# Patient Record
Sex: MALE | Race: WHITE | NOT HISPANIC OR LATINO | Employment: FULL TIME | URBAN - METROPOLITAN AREA
[De-identification: names, ages, dates, MRNs, and addresses within clinical notes are randomized per-mention and may not be internally consistent; named-entity substitution may affect disease eponyms.]

---

## 2017-12-07 ENCOUNTER — OFFICE VISIT - HEALTHEAST (OUTPATIENT)
Dept: FAMILY MEDICINE | Facility: CLINIC | Age: 42
End: 2017-12-07

## 2017-12-07 ENCOUNTER — COMMUNICATION - HEALTHEAST (OUTPATIENT)
Dept: TELEHEALTH | Facility: CLINIC | Age: 42
End: 2017-12-07

## 2017-12-07 DIAGNOSIS — E78.00 PURE HYPERCHOLESTEROLEMIA: ICD-10-CM

## 2017-12-07 DIAGNOSIS — I10 HIGH BLOOD PRESSURE: ICD-10-CM

## 2017-12-07 ASSESSMENT — MIFFLIN-ST. JEOR: SCORE: 2016.17

## 2017-12-12 ENCOUNTER — RECORDS - HEALTHEAST (OUTPATIENT)
Dept: ADMINISTRATIVE | Facility: OTHER | Age: 42
End: 2017-12-12

## 2017-12-26 ENCOUNTER — OFFICE VISIT - HEALTHEAST (OUTPATIENT)
Dept: FAMILY MEDICINE | Facility: CLINIC | Age: 42
End: 2017-12-26

## 2017-12-26 ENCOUNTER — COMMUNICATION - HEALTHEAST (OUTPATIENT)
Dept: FAMILY MEDICINE | Facility: CLINIC | Age: 42
End: 2017-12-26

## 2017-12-26 DIAGNOSIS — Z00.00 WELLNESS EXAMINATION: ICD-10-CM

## 2017-12-26 DIAGNOSIS — G47.33 OSA (OBSTRUCTIVE SLEEP APNEA): ICD-10-CM

## 2017-12-26 DIAGNOSIS — I10 HYPERTENSION: ICD-10-CM

## 2017-12-26 LAB — PSA SERPL-MCNC: 0.6 NG/ML (ref 0–2.5)

## 2017-12-26 RX ORDER — ASPIRIN 81 MG/1
TABLET, CHEWABLE ORAL
Status: SHIPPED | COMMUNITY
Start: 2017-12-12 | End: 2022-12-06

## 2017-12-26 ASSESSMENT — MIFFLIN-ST. JEOR: SCORE: 2006.53

## 2018-12-12 ENCOUNTER — OFFICE VISIT - HEALTHEAST (OUTPATIENT)
Dept: FAMILY MEDICINE | Facility: CLINIC | Age: 43
End: 2018-12-12

## 2018-12-12 ENCOUNTER — COMMUNICATION - HEALTHEAST (OUTPATIENT)
Dept: TELEHEALTH | Facility: CLINIC | Age: 43
End: 2018-12-12

## 2018-12-12 DIAGNOSIS — R03.0 ELEVATED BLOOD PRESSURE READING WITHOUT DIAGNOSIS OF HYPERTENSION: ICD-10-CM

## 2018-12-12 DIAGNOSIS — Q25.1 COARCTATION OF AORTA (PREDUCTAL) (POSTDUCTAL): ICD-10-CM

## 2018-12-21 ENCOUNTER — RECORDS - HEALTHEAST (OUTPATIENT)
Dept: LAB | Facility: CLINIC | Age: 43
End: 2018-12-21

## 2018-12-24 LAB — BACTERIA SPEC CULT: NORMAL

## 2019-04-16 ENCOUNTER — COMMUNICATION - HEALTHEAST (OUTPATIENT)
Dept: FAMILY MEDICINE | Facility: CLINIC | Age: 44
End: 2019-04-16

## 2019-04-16 ENCOUNTER — OFFICE VISIT - HEALTHEAST (OUTPATIENT)
Dept: FAMILY MEDICINE | Facility: CLINIC | Age: 44
End: 2019-04-16

## 2019-04-16 DIAGNOSIS — Z00.00 ROUTINE ADULT HEALTH MAINTENANCE: ICD-10-CM

## 2019-04-16 DIAGNOSIS — I10 HYPERTENSION, UNSPECIFIED TYPE: ICD-10-CM

## 2019-04-16 LAB
ANION GAP SERPL CALCULATED.3IONS-SCNC: 9 MMOL/L (ref 5–18)
BUN SERPL-MCNC: 20 MG/DL (ref 8–22)
CALCIUM SERPL-MCNC: 9.4 MG/DL (ref 8.5–10.5)
CHLORIDE BLD-SCNC: 107 MMOL/L (ref 98–107)
CHOLEST SERPL-MCNC: 223 MG/DL
CO2 SERPL-SCNC: 27 MMOL/L (ref 22–31)
CREAT SERPL-MCNC: 0.96 MG/DL (ref 0.7–1.3)
ERYTHROCYTE [DISTWIDTH] IN BLOOD BY AUTOMATED COUNT: 12.7 % (ref 11–14.5)
FASTING STATUS PATIENT QL REPORTED: YES
GFR SERPL CREATININE-BSD FRML MDRD: >60 ML/MIN/1.73M2
GLUCOSE BLD-MCNC: 98 MG/DL (ref 70–125)
HBA1C MFR BLD: 5.5 % (ref 3.5–6)
HCT VFR BLD AUTO: 47.2 % (ref 40–54)
HDLC SERPL-MCNC: 40 MG/DL
HGB BLD-MCNC: 15.8 G/DL (ref 14–18)
LDLC SERPL CALC-MCNC: 162 MG/DL
MCH RBC QN AUTO: 30.8 PG (ref 27–34)
MCHC RBC AUTO-ENTMCNC: 33.4 G/DL (ref 32–36)
MCV RBC AUTO: 92 FL (ref 80–100)
PLATELET # BLD AUTO: 173 THOU/UL (ref 140–440)
PMV BLD AUTO: 7.4 FL (ref 7–10)
POTASSIUM BLD-SCNC: 4.2 MMOL/L (ref 3.5–5)
PSA SERPL-MCNC: 0.6 NG/ML (ref 0–2.5)
RBC # BLD AUTO: 5.12 MILL/UL (ref 4.4–6.2)
SODIUM SERPL-SCNC: 143 MMOL/L (ref 136–145)
TRIGL SERPL-MCNC: 103 MG/DL
WBC: 7 THOU/UL (ref 4–11)

## 2019-04-16 ASSESSMENT — MIFFLIN-ST. JEOR: SCORE: 2079.11

## 2019-05-14 ENCOUNTER — OFFICE VISIT - HEALTHEAST (OUTPATIENT)
Dept: FAMILY MEDICINE | Facility: CLINIC | Age: 44
End: 2019-05-14

## 2019-05-14 DIAGNOSIS — I15.9 SECONDARY HYPERTENSION: ICD-10-CM

## 2019-05-14 DIAGNOSIS — E66.3 OVERWEIGHT: ICD-10-CM

## 2019-05-14 RX ORDER — FEXOFENADINE HCL 180 MG/1
180 TABLET ORAL DAILY
Status: SHIPPED | COMMUNITY
Start: 2019-05-14

## 2019-05-14 ASSESSMENT — MIFFLIN-ST. JEOR: SCORE: 2060.97

## 2019-08-08 ENCOUNTER — COMMUNICATION - HEALTHEAST (OUTPATIENT)
Dept: FAMILY MEDICINE | Facility: CLINIC | Age: 44
End: 2019-08-08

## 2019-08-08 DIAGNOSIS — I15.9 SECONDARY HYPERTENSION: ICD-10-CM

## 2019-12-03 ENCOUNTER — RECORDS - HEALTHEAST (OUTPATIENT)
Dept: ADMINISTRATIVE | Facility: OTHER | Age: 44
End: 2019-12-03

## 2020-03-21 ENCOUNTER — COMMUNICATION - HEALTHEAST (OUTPATIENT)
Dept: FAMILY MEDICINE | Facility: CLINIC | Age: 45
End: 2020-03-21

## 2020-03-21 DIAGNOSIS — I15.9 SECONDARY HYPERTENSION: ICD-10-CM

## 2020-06-29 ENCOUNTER — COMMUNICATION - HEALTHEAST (OUTPATIENT)
Dept: FAMILY MEDICINE | Facility: CLINIC | Age: 45
End: 2020-06-29

## 2020-06-29 DIAGNOSIS — I15.9 SECONDARY HYPERTENSION: ICD-10-CM

## 2020-07-08 ENCOUNTER — OFFICE VISIT - HEALTHEAST (OUTPATIENT)
Dept: FAMILY MEDICINE | Facility: CLINIC | Age: 45
End: 2020-07-08

## 2020-07-08 DIAGNOSIS — I15.9 SECONDARY HYPERTENSION: ICD-10-CM

## 2020-07-08 DIAGNOSIS — Z00.00 WELLNESS EXAMINATION: ICD-10-CM

## 2020-07-08 LAB
ANION GAP SERPL CALCULATED.3IONS-SCNC: 9 MMOL/L (ref 5–18)
BUN SERPL-MCNC: 20 MG/DL (ref 8–22)
CALCIUM SERPL-MCNC: 9.4 MG/DL (ref 8.5–10.5)
CHLORIDE BLD-SCNC: 107 MMOL/L (ref 98–107)
CO2 SERPL-SCNC: 27 MMOL/L (ref 22–31)
CREAT SERPL-MCNC: 1.08 MG/DL (ref 0.7–1.3)
ERYTHROCYTE [DISTWIDTH] IN BLOOD BY AUTOMATED COUNT: 12.1 % (ref 11–14.5)
GFR SERPL CREATININE-BSD FRML MDRD: >60 ML/MIN/1.73M2
GLUCOSE BLD-MCNC: 88 MG/DL (ref 70–125)
HCT VFR BLD AUTO: 46 % (ref 40–54)
HGB BLD-MCNC: 16.1 G/DL (ref 14–18)
MCH RBC QN AUTO: 30.7 PG (ref 27–34)
MCHC RBC AUTO-ENTMCNC: 35 G/DL (ref 32–36)
MCV RBC AUTO: 88 FL (ref 80–100)
PLATELET # BLD AUTO: 198 THOU/UL (ref 140–440)
PMV BLD AUTO: 8.2 FL (ref 7–10)
POTASSIUM BLD-SCNC: 3.9 MMOL/L (ref 3.5–5)
PSA SERPL-MCNC: 0.5 NG/ML (ref 0–2.5)
RBC # BLD AUTO: 5.24 MILL/UL (ref 4.4–6.2)
SODIUM SERPL-SCNC: 143 MMOL/L (ref 136–145)
WBC: 8.6 THOU/UL (ref 4–11)

## 2020-07-08 RX ORDER — LISINOPRIL 20 MG/1
20 TABLET ORAL DAILY
Qty: 90 TABLET | Refills: 3 | Status: SHIPPED | OUTPATIENT
Start: 2020-07-08 | End: 2022-07-07

## 2020-07-08 ASSESSMENT — MIFFLIN-ST. JEOR: SCORE: 2034.32

## 2020-07-13 ENCOUNTER — COMMUNICATION - HEALTHEAST (OUTPATIENT)
Dept: FAMILY MEDICINE | Facility: CLINIC | Age: 45
End: 2020-07-13

## 2020-12-16 ENCOUNTER — COMMUNICATION - HEALTHEAST (OUTPATIENT)
Dept: SCHEDULING | Facility: CLINIC | Age: 45
End: 2020-12-16

## 2021-02-12 ENCOUNTER — OFFICE VISIT - HEALTHEAST (OUTPATIENT)
Dept: FAMILY MEDICINE | Facility: CLINIC | Age: 46
End: 2021-02-12

## 2021-02-12 DIAGNOSIS — I10 BENIGN ESSENTIAL HYPERTENSION: ICD-10-CM

## 2021-02-12 DIAGNOSIS — M79.18 MYOFASCIAL MUSCLE PAIN: ICD-10-CM

## 2021-02-15 ENCOUNTER — OFFICE VISIT - HEALTHEAST (OUTPATIENT)
Dept: FAMILY MEDICINE | Facility: CLINIC | Age: 46
End: 2021-02-15

## 2021-02-15 DIAGNOSIS — I10 BENIGN ESSENTIAL HYPERTENSION: ICD-10-CM

## 2021-02-15 DIAGNOSIS — E66.01 MORBID OBESITY (H): ICD-10-CM

## 2021-02-15 DIAGNOSIS — M25.512 PAIN IN SHOULDER REGION, LEFT: ICD-10-CM

## 2021-02-15 ASSESSMENT — MIFFLIN-ST. JEOR: SCORE: 2077.41

## 2021-05-27 NOTE — PROGRESS NOTES
Assessment:      Healthy male exam.      Plan:    1. Routine adult health maintenance  Encourage weight loss, reviewed immunizations and updated, encourage continue baby aspirin.  - Tdap vaccine greater than or equal to 6yo IM  - HM2(CBC w/o Differential)  - Basic Metabolic Panel  - PSA (Prostatic-Specific Antigen), Annual Screen  - Lipid Cascade  - Glycosylated Hemoglobin A1c    2. Hypertension-new and separate problem  The patient continues to have elevated blood pressure and we will need to treat it.  We will start lisinopril and see him back in 4 weeks.  - lisinopril (PRINIVIL,ZESTRIL) 10 MG tablet; Take 1 tablet (10 mg total) by mouth daily.  Dispense: 30 tablet; Refill: 11       All questions answered.     Subjective:      Alfredo Velasquez is a 43 y.o. male who presents for an annual exam.  He notes that his blood pressure continues to be borderline high.  He also has mild seasonal allergies which she is treating over-the-counter.  He is having difficulty doing regular exercise and his weight has not changed.  Healthy Habits:   Regular Exercise: Yes and No  Sunscreen Use: Yes and No  Healthy Diet: No  Dental Visits Regularly: Yes  Seat Belt: Yes  Sexually active: Yes  Monthly Self Testicular Exams:  Yes  Hemoccults: No  Flex Sig: No  Colonoscopy: No  Lipid Profile: Yes  Glucose Screen: Yes  Prevention of Osteoporosis: Yes  Last Dexa: No  Guns at Home:  No      Immunization History   Administered Date(s) Administered     Influenza, inj, historic,unspecified 10/03/2012, 10/19/2018     Td,adult,historic,unspecified 01/10/2008     Tdap 01/10/2008     Immunization status: up to date and documented.    No exam data present     Current Outpatient Medications   Medication Sig Dispense Refill     aspirin 81 mg chewable tablet Chew.       No current facility-administered medications for this visit.      No past medical history on file.  No past surgical history on file.  Patient has no known allergies.  No family  "history on file.  Social History     Socioeconomic History     Marital status:      Spouse name: Not on file     Number of children: Not on file     Years of education: Not on file     Highest education level: Not on file   Occupational History     Not on file   Social Needs     Financial resource strain: Not on file     Food insecurity:     Worry: Not on file     Inability: Not on file     Transportation needs:     Medical: Not on file     Non-medical: Not on file   Tobacco Use     Smoking status: Not on file   Substance and Sexual Activity     Alcohol use: Not on file     Drug use: Not on file     Sexual activity: Not on file   Lifestyle     Physical activity:     Days per week: Not on file     Minutes per session: Not on file     Stress: Not on file   Relationships     Social connections:     Talks on phone: Not on file     Gets together: Not on file     Attends Synagogue service: Not on file     Active member of club or organization: Not on file     Attends meetings of clubs or organizations: Not on file     Relationship status: Not on file     Intimate partner violence:     Fear of current or ex partner: Not on file     Emotionally abused: Not on file     Physically abused: Not on file     Forced sexual activity: Not on file   Other Topics Concern     Not on file   Social History Narrative     Not on file       Review of Systems  Review of Systems          Objective:     Vitals:    04/16/19 0901 04/16/19 0905   BP: (!) 158/100 140/90   Pulse: (!) 56    Resp: 16    SpO2: 98%    Weight: (!) 268 lb (121.6 kg)    Height: 5' 8.25\" (1.734 m)      Body mass index is 40.45 kg/m .    Physical  Physical Exam     Constitutional:    --Vitals as above  --No acute distress  Eyes-  --Sclera noninjected  --Lids and conjunctiva normal  ENT-  --TMs clear  --Sclera noninjected  --Pharynx not erythematous  Neck-  --Neck supple with no cervical lymphadenopathy  Lungs-  --Clear to Auscultation  Heart-  --Regular rate and " rhythm  Abdomen--  --Soft, non-tender, non-distended  Skin-  --Pink and dry  Psych-  --Alert and oriented  --Normal affect

## 2021-05-28 ENCOUNTER — RECORDS - HEALTHEAST (OUTPATIENT)
Dept: ADMINISTRATIVE | Facility: CLINIC | Age: 46
End: 2021-05-28

## 2021-05-28 NOTE — PROGRESS NOTES
"Chief Complaint   Patient presents with     Follow-up     medications        HPI: Patient presents today for multiple issues.  The first is blood pressure.  His blood pressure has been elevated but he is currently taking lisinopril 10 mg daily and today our reading is 132/88 in the clinic.  He does not check his blood pressure out of the clinic.    He also continues to be over nourished.  He states he is lost 3 pounds since her last visit.  He is cutting down portion sizes and increasing exercises.    ROS: No chest pain or shortness of breath or syncope    SH:    reports that he has never smoked. He has never used smokeless tobacco.      FH: The Patient's family history is not on file.     Meds:  Alfredo has a current medication list which includes the following prescription(s): aspirin, fexofenadine, lisinopril, and lisinopril.    O:  /88   Pulse 62   Ht 5' 8.25\" (1.734 m)   Wt (!) 264 lb (119.7 kg)   SpO2 96%   BMI 39.85 kg/m    Alert conversant no acute distress  Ankles show no evidence of edema  Neuro-moves all 4 extremities pupils are equal    A/P:   1. Secondary hypertension  Blood pressure is improving however still not at goal.  Will increase lisinopril to 20 mg daily and recheck in 90 days  - lisinopril (PRINIVIL,ZESTRIL) 20 MG tablet; Take 1 tablet (20 mg total) by mouth daily.  Dispense: 90 tablet; Refill: 0    2. Overweight  Encourage weight loss and exercise.    RTC 3 months                                          "

## 2021-05-29 ENCOUNTER — RECORDS - HEALTHEAST (OUTPATIENT)
Dept: ADMINISTRATIVE | Facility: CLINIC | Age: 46
End: 2021-05-29

## 2021-05-31 VITALS — WEIGHT: 252 LBS | HEIGHT: 68 IN | BODY MASS INDEX: 38.19 KG/M2

## 2021-05-31 VITALS — BODY MASS INDEX: 37.51 KG/M2 | HEIGHT: 69 IN | WEIGHT: 253.25 LBS

## 2021-05-31 NOTE — TELEPHONE ENCOUNTER
Refill Approved    Rx renewed per Medication Renewal Policy. Medication was last renewed on 5/14/19.    Randa Branch, [facility] Triage/Med Refill 8/9/2019     Requested Prescriptions   Pending Prescriptions Disp Refills     lisinopril (PRINIVIL,ZESTRIL) 20 MG tablet [Pharmacy Med Name: LISINOPRIL 20MG TABS] 90 tablet 0     Sig: TAKE ONE TABLET BY MOUTH EVERY DAY       [facility] Refill Protocol Passed - 8/8/2019  4:09 AM        Passed - PCP or prescribing provider visit in past 12 months       Last office visit with prescriber/PCP: 5/14/2019 Gilda Hernandez MD OR same dept: 5/14/2019 Gilda Hernandez MD OR same specialty: 5/14/2019 Gilda Hernandez MD  Last physical: 4/16/2019 Last MTM visit: Visit date not found   Next visit within 3 mo: Visit date not found  Next physical within 3 mo: Visit date not found  Prescriber OR PCP: Gilda Hernandez MD  Last diagnosis associated with med order: 1. Secondary hypertension  - lisinopril (PRINIVIL,ZESTRIL) 20 MG tablet [Pharmacy Med Name: LISINOPRIL 20MG TABS]; TAKE ONE TABLET BY MOUTH EVERY DAY  Dispense: 90 tablet; Refill: 0    If protocol passes may refill for 12 months if within 3 months of last provider visit (or a total of 15 months).             Passed - Serum Potassium in past 12 months     Lab Results   Component Value Date    Potassium 4.2 04/16/2019             Passed - Blood pressure filed in past 12 months     BP Readings from Last 1 Encounters:   05/14/19 132/88             Passed - Serum Creatinine in past 12 months     Creatinine   Date Value Ref Range Status   04/16/2019 0.96 0.70 - 1.30 mg/dL Final

## 2021-06-02 VITALS — WEIGHT: 268 LBS | HEIGHT: 68 IN | BODY MASS INDEX: 40.62 KG/M2

## 2021-06-02 VITALS — WEIGHT: 269.25 LBS | BODY MASS INDEX: 40.64 KG/M2

## 2021-06-03 VITALS — WEIGHT: 264 LBS | BODY MASS INDEX: 40.01 KG/M2 | HEIGHT: 68 IN

## 2021-06-04 VITALS
TEMPERATURE: 98.7 F | SYSTOLIC BLOOD PRESSURE: 138 MMHG | DIASTOLIC BLOOD PRESSURE: 80 MMHG | HEIGHT: 68 IN | BODY MASS INDEX: 39.25 KG/M2 | HEART RATE: 68 BPM | OXYGEN SATURATION: 96 % | WEIGHT: 259 LBS

## 2021-06-05 VITALS
RESPIRATION RATE: 14 BRPM | SYSTOLIC BLOOD PRESSURE: 182 MMHG | WEIGHT: 269 LBS | OXYGEN SATURATION: 98 % | TEMPERATURE: 97.8 F | HEART RATE: 61 BPM | BODY MASS INDEX: 39.72 KG/M2 | DIASTOLIC BLOOD PRESSURE: 94 MMHG

## 2021-06-05 VITALS
BODY MASS INDEX: 39.25 KG/M2 | DIASTOLIC BLOOD PRESSURE: 88 MMHG | OXYGEN SATURATION: 98 % | HEIGHT: 69 IN | SYSTOLIC BLOOD PRESSURE: 128 MMHG | HEART RATE: 67 BPM | WEIGHT: 265 LBS

## 2021-06-07 NOTE — TELEPHONE ENCOUNTER
Refill Request  Did you contact pharmacy: No  Medication name: lisinopril (PRINIVIL,ZESTRIL) 20 MG tablet  Requested Prescriptions      No prescriptions requested or ordered in this encounter     Who prescribed the medication: 05/14/19  Requested Pharmacy: [facility]  Is patient out of medication: No.  4 days left  Patient notified refills processed in 3 business days:  yes  Okay to leave a detailed message: yes

## 2021-06-07 NOTE — TELEPHONE ENCOUNTER
Refill Approved    Rx renewed per Medication Renewal Policy. Medication was last renewed on 8/9/19, last OV 5/14/19.    Kaylyn John, [facility] Triage/Med Refill 3/21/2020     Requested Prescriptions   Pending Prescriptions Disp Refills     lisinopriL (PRINIVIL,ZESTRIL) 20 MG tablet 90 tablet 1     Sig: Take 1 tablet (20 mg total) by mouth daily.       There is no refill protocol information for this order

## 2021-06-09 NOTE — TELEPHONE ENCOUNTER
RN cannot approve Refill Request    RN can NOT refill this medication Protocol failed and NO refill given.      Indy Lloyd, [facility] Triage/Med Refill 6/30/2020    Requested Prescriptions   Pending Prescriptions Disp Refills     lisinopriL (PRINIVIL,ZESTRIL) 20 MG tablet 90 tablet 3     Sig: Take 1 tablet (20 mg total) by mouth daily.       [facility] Refill Protocol Failed - 6/29/2020 12:35 PM        Failed - PCP or prescribing provider visit in past 12 months       Last office visit with prescriber/PCP: 5/14/2019 Gilda Hernandez MD OR same dept: Visit date not found OR same specialty: 5/14/2019 Gilda Hernandez MD  Last physical: 4/16/2019 Last MTM visit: Visit date not found   Next visit within 3 mo: Visit date not found  Next physical within 3 mo: Visit date not found  Prescriber OR PCP: Gilda Hernandez MD  Last diagnosis associated with med order: 1. Secondary hypertension  - lisinopriL (PRINIVIL,ZESTRIL) 20 MG tablet; Take 1 tablet (20 mg total) by mouth daily.  Dispense: 90 tablet; Refill: 0    If protocol passes may refill for 12 months if within 3 months of last provider visit (or a total of 15 months).             Failed - Serum Potassium in past 12 months     No results found for: LN-POTASSIUM          Failed - Blood pressure filed in past 12 months     BP Readings from Last 1 Encounters:   05/14/19 132/88             Failed - Serum Creatinine in past 12 months     Creatinine   Date Value Ref Range Status   04/16/2019 0.96 0.70 - 1.30 mg/dL Final

## 2021-06-09 NOTE — PROGRESS NOTES
Assessment:      Healthy male exam.      Plan:    1. Secondary hypertension  The patient's blood pressure is relatively well controlled although he is under quite a bit of stress right now.  We will continue with the 20 mg of lisinopril and increase if needed.  - lisinopriL (PRINIVIL,ZESTRIL) 20 MG tablet; Take 1 tablet (20 mg total) by mouth daily.  Dispense: 90 tablet; Refill: 3    2. Wellness examination  Encourage exercise, [employer] attendance, weight loss, and healthy diet.  - HM2(CBC w/o Differential)  - Basic Metabolic Panel  - PSA (Prostatic-Specific Antigen), Annual Screen  - Tdap vaccine,  6yo or older,  IM    RTC 1 year     All questions answered.     Subjective:      Alfredo Velasquez is a 44 y.o. male who presents for an annual exam.  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  He is undergoing a great deal of stress due to the coronavirus hysteria and political unrest in the community.  Healthy Habits:   Regular Exercise: N/A  Sunscreen Use: No  Healthy Diet: Yes  Dental Visits Regularly: Yes/no  Seat Belt: Yes  Sexually active: Yes  Monthly Self Testicular Exams:  Yes  Hemoccults: No  Flex Sig: No  Colonoscopy: No  Lipid Profile: Yes  Glucose Screen: Yes  Prevention of Osteoporosis: Yes  Last Dexa: No  Guns at Home:  No      Immunization History   Administered Date(s) Administered     Influenza, inj, historic,unspecified 10/03/2012, 10/19/2018     Influenza,seasonal, Inj IIV3 12/09/2005, 10/03/2012     Td,adult,historic,unspecified 01/10/2008     Tdap 01/10/2008, 04/16/2019     Immunization status: up to date and documented.    No exam data present     Current Outpatient Medications   Medication Sig Dispense Refill     fexofenadine (ALLEGRA) 180 MG tablet Take 180 mg by mouth daily.       lisinopriL (PRINIVIL,ZESTRIL) 20 MG tablet Take 1 tablet (20 mg total) by mouth daily. 15 tablet 0     aspirin 81 mg chewable tablet Chew.       No current facility-administered medications for this visit.       No past medical history on file.  No past surgical history on file.  Patient has no known allergies.  No family history on file.  Social History     Socioeconomic History     Marital status:      Spouse name: Not on file     Number of children: Not on file     Years of education: Not on file     Highest education level: Not on file   Occupational History     Not on file   Social Needs     Financial resource strain: Not on file     Food insecurity     Worry: Not on file     Inability: Not on file     Transportation needs     Medical: Not on file     Non-medical: Not on file   Tobacco Use     Smoking status: Never Smoker     Smokeless tobacco: Never Used   Substance and Sexual Activity     Alcohol use: Not on file     Drug use: Not on file     Sexual activity: Not on file   Lifestyle     Physical activity     Days per week: Not on file     Minutes per session: Not on file     Stress: Not on file   Relationships     Social connections     Talks on phone: Not on file     Gets together: Not on file     Attends Ho[employer] service: Not on file     Active member of club or organization: Not on file     Attends meetings of clubs or organizations: Not on file     Relationship status: Not on file     Intimate partner violence     Fear of current or ex partner: Not on file     Emotionally abused: Not on file     Physically abused: Not on file     Forced sexual activity: Not on file   Other Topics Concern     Not on file   Social History Narrative     Not on file       Review of Systems  Review of Systems          Objective:     There were no vitals filed for this visit.  There is no height or weight on file to calculate BMI.    Physical  Physical Exam     Constitutional:    --Vitals as above  --No acute distress  Eyes-  --Sclera noninjected  --Lids and conjunctiva normal  ENT-  --TMs clear  --Sclera noninjected  --Pharynx not erythematous  Neck-  --Neck supple with no cervical lymphadenopathy  Lungs-  --Clear to  Auscultation  Heart-  --Regular rate and rhythm  Abdomen--  --Soft, non-tender, non-distended  Skin-  --Pink and dry  Psych-  --Alert and oriented  --Normal affect

## 2021-06-09 NOTE — TELEPHONE ENCOUNTER
Patient Returning Call  Reason for call:  Return call  Information relayed to patient:  n/a  Patient has additional questions:  Yes  If YES, what are your questions/concerns:  Patient stated he is scheduled for 7/8 with Gilda Hernandez MD but he does not have enough to get him thru his appointment. Patient is questioning if he can get enough to get him thru his appointment. Patient stated he only has 1 pill for tonight.  Okay to leave a detailed message?: Yes  [phone]

## 2021-06-13 NOTE — TELEPHONE ENCOUNTER
Gas leak at work.  Ha and shaky, thirsty. Nausea.  Went home.    Natural gas leak? Not sure. Dizzy, light headed.  Family member is bringing him to the ED.    Krystal Kevin RN [facility] Triage Nurse Advisor    Reason for Disposition    [1] Breathing difficulty persists > 10 minutes AND [2] after moving to fresh air    Additional Information    Negative: [1] Breathing stopped AND [2] hasn't returned    Negative: Severe difficulty breathing (e.g., struggling for each breath, speaks in single words)    Negative: Severe weakness (i.e., unable to walk or barely able to walk, requires support)    Negative: Difficult to awaken or acting confused (e.g., disoriented, slurred speech)    Negative: Bluish (or gray) lips or face now    Negative: Chest pain, tightness, or heaviness (present now)    Negative: Seizure    Negative: Coughing up dark sputum (e.g., soot)    Negative: Stridor or hoarseness (change in voice)    Negative: Facial burns, mouth burns, or singed nasal hairs    Negative: Patient attempted suicide (e.g., sitting in garage with car running)    Negative: [1] Bioterrorist event, known or suspected AND [2] exposure to biological, chemical or radioactive substance    Negative: Sounds like a life-threatening emergency to the triager    Protocols used: SMOKE AND FUME INHALATION-A-

## 2021-06-14 NOTE — PROGRESS NOTES
"    DATE OF SERVICE: 2017    SUBJECTIVE:  Very pleasant 42-year-old gentleman presents today for recheck of blood  pressure.  He was seen at urgent care for a \\"cold\\" and was noted to have blood  pressure 155 systolic.  Since that time his wife has been taking his blood pressure  and she finds it is being consistently in the 140/85 range or close to that.  The  patient has a past history of hyperlipidemia, but has not been seen in our clinic  for approximately 3-1/2 years.    FAMILY HISTORY:  Notable for mother with hypertension.  Father  of coronary  artery disease.      Socially, he does not smoke.  He has 4 children.  He works in the [employer] industry.      REVIEW OF SYSTEMS:  No fever or chills.  Mild sore throat noted.  No chest pain or  shortness of breath.  There is 1+ ankle edema bilaterally which is constant for him.    OBJECTIVE:  VITAL SIGNS:  Shows a blood pressure 140/88, pulse 74, respirations 16.  He is  alert, conversant, in no acute distress.  HEENT:  TMs clear, sclerae not injected pharynx is slightly erythematous.    NECK:  Supple, with mild cervical lymphadenopathy, no thyromegaly noted.  LUNGS:  Clear to auscultation.  HEART:  Regular rhythm, normal S1, normal, S2.  1+ ankle edema noted.  ABDOMEN:  Obese but nontender.  SKIN:  Pink and dry.  PSYCH:  Alert and oriented x3, makes good eye contact.  He has good memory, insight  and judgment.    ASSESSMENT:  1.  Elevated blood pressure.  2.  Obesity.  3.  Hyperlipidemia by history.    PLAN:  1.  Discussed blood pressure in detail and control with diet and exercise.  We  discussed JNC 8 parameters.  The patient and his wife have a blood pressure  measuring device and his wife will check it 3 times a week over the next couple  weeks and he will follow up for a full physical exam.  Would consider medication but  would like to avoid that if possible.   2.  Full physical exam within the next couple weeks.   3.  Discussed his weight in detail.  " He set a weight goal of 200.  We discussed ways  of getting weight down including increased exercise, decrease calories.  4.  Aspirin 81 mg daily prescribed.  5.  Patient will follow up for full physical exam or sooner if needed.      HANY MÉNDEZ MD  pa  D 12/07/2017 14:04:18  T 12/07/2017 14:49:04  R 12/07/2017 14:49:04  [id number]        cc:HANY MÉNDEZ MD

## 2021-06-15 NOTE — PROGRESS NOTES
Assessment:     1. Myofascial muscle pain  cyclobenzaprine (FLEXERIL) 5 MG tablet   2. Benign essential hypertension            Plan:     Patient with myofascial muscle pain, possibly due to to his recent heavy physical activity at work.  Prescription given for cyclobenzaprine.  May also take ibuprofen 400 mg every 6 hours as needed with food.  May use heat to the area as well as massage.  Follow-up with his primary care provider if his symptoms are getting worse or not improving.    Blood pressure noted to be quite elevated today and still continues to be elevated upon recheck.  He does have a wrist blood pressure monitor at home and advised that he continue to take his blood pressure at home and to follow-up with his primary care provider if it still continues to be elevated.  Blood pressure not elevated at his previous visit 6 months ago.   :149    Patient Instructions   Recommend heat, massage, and ibuprofen 400 mg every 6 hours as needed.  Take muscle relaxant up to three times a day as needed (do not operate a care or heavy machinery while taking).        Subjective:       45 y.o. male presents for evaluation of approximately 1 day history of pain and tenderness just medial to his left shoulder blade.  The pain is worse when he takes a deep breath in and when he moves his left arm.  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  He denies any fevers, chills, shortness of breath, dyspnea on exertion, orthopnea, PND.  He has not noticed any skin rashes.  He has some slight radiation of discomfort down his left arm when he moves his arm but otherwise he is not having any pain or tingling.  He denies any neck pain.  He denies any chest pain or radiation of pain to his jaw.  He is otherwise been feeling well.  He has not taken anything for his symptoms.    Patient Active Problem List   Diagnosis     Essential Hypercholesterolemia     Foot Pain (Soft Tissue)     Pes Planus     Acute  Sore Throat     High blood pressure       No past medical history on file.    No past surgical history on file.    Current Outpatient Medications on File Prior to Visit   Medication Sig Dispense Refill     aspirin 81 mg chewable tablet Chew.       fexofenadine (ALLEGRA) 180 MG tablet Take 180 mg by mouth daily.       lisinopriL (PRINIVIL,ZESTRIL) 20 MG tablet Take 1 tablet (20 mg total) by mouth daily. 90 tablet 3     No current facility-administered medications on file prior to visit.        No Known Allergies      Review of Systems  A 12 point comprehensive review of systems was negative except as noted.      Objective:   BP (!) 182/94 (Patient Site: Right Arm, Patient Position: Sitting, Cuff Size: Adult Large)   Pulse 61   Temp 97.8  F (36.6  C) (Oral)   Resp 14   Wt (!) 269 lb (122 kg)   SpO2 98%   BMI 39.72 kg/m         General appearance: alert, appears stated age and cooperative  Back: Patient noted to have point tenderness of the musculature just medial to the scapula on the left.  Pain is reproduced in this area when he takes a deep breath in or when he moves his left arm.  There is no overlying skin changes.  No vertebral tenderness.  Lungs: clear to auscultation bilaterally  Chest wall: no tenderness  Heart: regular rate and rhythm, S1, S2 normal, no murmur, click, rub or gallop       This note has been dictated using voice recognition software. Any grammatical or context distortions are unintentional and inherent to the software

## 2021-06-15 NOTE — PATIENT INSTRUCTIONS - HE
Recommend heat, massage, and ibuprofen 400 mg every 6 hours as needed.  Take muscle relaxant up to three times a day as needed (do not operate a care or heavy machinery while taking).      
25-Jul-2019 01:47

## 2021-06-15 NOTE — PROGRESS NOTES
DATE OF SERVICE: 12/26/2017    SUBJECTIVE:  This is a very pleasant 42-year-old male who presents today for  physical examination.  He has a history of sleep apnea for which he uses CPAP,  hypertension and he is checking his blood pressure, he has some mild hyperlipidemia,  and he is overweight.  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      SOCIAL HISTORY:  He does not smoke.    REVIEW OF SYSTEMS:  No fever, chills, chest pain or shortness of breath.  The bowel  and bladder are working well with no difficulty with intercourse.    MEDICATIONS:  81 mg aspirin daily.    OBJECTIVE:  VITAL SIGNS:  138/82, pulse 50, respirations 16.  HEENT:  TMs clear, sclerae not injected, pharynx nonerythematous.    NECK:  Supple, with no lymphadenopathy.  LUNGS:  Clear to auscultation.  HEART:  Regular rate and rhythm, normal S1, normal S2.  ABDOMEN:  Soft, nontender, no masses.  No hepatosplenomegaly.  PSYCHIATRIC:  Alert and oriented x3.    ASSESSMENT:  1.  Over nourishment.  2.  Obstructive sleep apnea.  3.  Hypertension.  4.  Hyperlipidemia.    PLAN:  1.  We will check labs today.  2.  Anticipatory guidance given.  3.  Health maintenance items are reviewed and immunizations reviewed.  4.  Encourage weight loss.  5.  Encourage continued CPAP.  6.  We discussed blood pressure in detail and his blood pressures are running in the  140 range systolically.  We discussed medication but patient declined.  He agreed to  follow up in 3 months, continue to lose weight and exercise, and then consider  medication at that point if not improved.      HANY MÉNDEZ MD  ca  D 12/26/2017 08:40:32  T 12/26/2017 09:24:44  R 12/26/2017 09:24:44  [id number]        cc:HANY MÉNDEZ MD

## 2021-06-15 NOTE — PROGRESS NOTES
ASSESSMENT/PLAN:       1. Pain in shoulder region, left  This is likely overuse type syndrome improving and he can continue using acetaminophen for pain and try to keep doing range of motion exercises.  Excuse from work February 14-17 and then is not scheduled to go back to work until February 20 which we will have him do without any restrictions at that time.  If he does not feel that he is going to be able to go back to work on the 20th he will need to be seen on the 19th.  Should try to avoid lifting with his arms above shoulder level.      2. Morbid obesity (H)  Weight loss is very important for him in regard to his overall health with cardiovascular system.  Note that his BMI is 39.1    3. Benign essential hypertension  His blood pressure seems to be okay today and will continue with lisinopril 20 mg daily  Continue to monitor blood pressure outside of the office with goal of systolic blood pressure in the 130s or less and diastolic in the 80s or less    Office visit E/M total time 20 minutes  Follow-up 4 days if not well enough to go back to work on 2/20/2021    Enrique Mcdaniels MD      PROGRESS NOTE   2/16/2021    SUBJECTIVE:  Alfredo Velasquez is a 45 y.o. male  who presents for   Chief Complaint   Patient presents with     Follow-up     left shoulder , numb tight, went to urgent care 2/12 [facility]      Blood Pressure Check     bp has been high,     Paperwork     note for work      The patient is seen today because of pain in his left shoulder blade and shoulder area.  Started on February 11, 2021 and patient was seen at urgent care the following day.  Was given a muscle relaxant which he does not feel really helped.  He has not been taking very much for pain.  The rest has been helpful.  His pain now is more in the anterior left shoulder.  He has tightness in the area.  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  A lot of repetitive work with lifting and throwing hands at night weight  "10 to 15 pounds and pushing and pulling carts that are 300 to 400 pounds.  He did not recall an injury per se at work but he has been doing this kind of repetitive work for a number of years.    The patient's blood pressures also been elevated and he is taking lisinopril 20 mg daily.  Had a basic metabolic panel with a random blood sugar of 117 in December 2020.  At that time his electrolytes and creatinine look satisfactory.    Patient Active Problem List   Diagnosis     Essential Hypercholesterolemia     Foot Pain (Soft Tissue)     Pes Planus     Acute Sore Throat     High blood pressure     Obesity (BMI 35.0-39.9) with comorbidity (H)       Current Outpatient Medications   Medication Sig Dispense Refill     aspirin 81 mg chewable tablet Chew.       fexofenadine (ALLEGRA) 180 MG tablet Take 180 mg by mouth daily.       lisinopriL (PRINIVIL,ZESTRIL) 20 MG tablet Take 1 tablet (20 mg total) by mouth daily. 90 tablet 3     cyclobenzaprine (FLEXERIL) 5 MG tablet Take 1 tablet (5 mg total) by mouth 3 (three) times a day as needed for muscle spasms. 21 tablet 0     No current facility-administered medications for this visit.        Social History     Tobacco Use   Smoking Status Never Smoker   Smokeless Tobacco Never Used           OBJECTIVE:        No results found for this or any previous visit (from the past 240 hour(s)).    Vitals:    02/15/21 1328   BP: 128/88   Pulse: 67   SpO2: 98%   Weight: (!) 265 lb (120.2 kg)   Height: 5' 9\" (1.753 m)     Weight: (!) 265 lb (120.2 kg)          Physical Exam:  GENERAL APPEARANCE: 45-year-old male very pleasant, NAD, well hydrated, well nourished  SKIN:  Normal skin turgor, no lesions/rashes   HEENT: moist mucous membranes, no rhinorrhea  NECK: Normal without adenopathy or masses  CV: RRR, no M/G/R   LUNGS: CTAB  The patient really does not have much tenderness in the rhomboid area or scapular area as he did before but now mild tenderness in the anterior shoulder in the region " of the bicep tendon attachment.  He has fairly good range of motion without any limitation and negative impingement sign.  Strength seems to be good on stress testing the rotator cuff.  EXTREMITY: no edema and full ROM of all joints  NEURO: no focal findings

## 2021-06-16 PROBLEM — I10 HIGH BLOOD PRESSURE: Status: ACTIVE | Noted: 2017-12-07

## 2021-06-16 PROBLEM — E66.01 MORBID OBESITY (H): Status: ACTIVE | Noted: 2021-02-16

## 2021-06-19 NOTE — LETTER
Letter by Gilda Hernandez MD at      Author: Gilda Hernandez MD Service: -- Author Type: --    Filed:  Encounter Date: 4/16/2019 Status: (Other)         Alfredo Velasquez  [address] [place] [address]            April 16, 2019        Dear Mr. Velasquez,        Below are the results from your recent visit:    Resulted Orders   HM2(CBC w/o Differential)   Result Value Ref Range    WBC 7.0 4.0 - 11.0 thou/uL    RBC 5.12 4.40 - 6.20 mill/uL    Hemoglobin 15.8 14.0 - 18.0 g/dL    Hematocrit 47.2 40.0 - 54.0 %    MCV 92 80 - 100 fL    MCH 30.8 27.0 - 34.0 pg    MCHC 33.4 32.0 - 36.0 g/dL    RDW 12.7 11.0 - 14.5 %    Platelets 173 140 - 440 thou/uL    MPV 7.4 7.0 - 10.0 fL   Basic Metabolic Panel   Result Value Ref Range    Sodium 143 136 - 145 mmol/L    Potassium 4.2 3.5 - 5.0 mmol/L    Chloride 107 98 - 107 mmol/L    CO2 27 22 - 31 mmol/L    Anion Gap, Calculation 9 5 - 18 mmol/L    Glucose 98 70 - 125 mg/dL    Calcium 9.4 8.5 - 10.5 mg/dL    BUN 20 8 - 22 mg/dL    Creatinine 0.96 0.70 - 1.30 mg/dL    GFR MDRD Af Amer >60 >60 mL/min/1.73m2    GFR MDRD Non Af Amer >60 >60 mL/min/1.73m2    Narrative    Fasting Glucose reference range is 70-99 mg/dL per  American Diabetes Association (ADA) guidelines.   PSA (Prostatic-Specific Antigen), Annual Screen   Result Value Ref Range    PSA 0.6 0.0 - 2.5 ng/mL    Narrative    Method is Abbott Prostate-Specific Antigen (PSA)  Standard-WHO 1st International (90:10)   Lipid Cascade   Result Value Ref Range    Cholesterol 223 (H) <=199 mg/dL    Triglycerides 103 <=149 mg/dL    HDL Cholesterol 40 >=40 mg/dL    LDL Calculated 162 (H) <=129 mg/dL    Patient Fasting > 8hrs? Yes    Glycosylated Hemoglobin A1c   Result Value Ref Range    Hemoglobin A1c 5.5 3.5 - 6.0 %         Alfredo, your laboratory results are normal.  That is good news.  Thank you for coming in to visit.  We should visit again in 4 weeks to make sure the blood pressure medication is  working.      Sincerely,        BETH Hernandez MD, MINNIE  [facility]  [phone]

## 2021-06-20 NOTE — LETTER
Letter by Gilda Hernandez MD at      Author: Gilda Hernandez MD Service: -- Author Type: --    Filed:  Encounter Date: 7/13/2020 Status: (Other)         Alfredo CASSIE Velasquez  [address] [place] [address]            July 13, 2020        Dear Aristeo Eva,        Below are the results from your recent visit:    Resulted Orders   HM2(CBC w/o Differential)   Result Value Ref Range    WBC 8.6 4.0 - 11.0 thou/uL    RBC 5.24 4.40 - 6.20 mill/uL    Hemoglobin 16.1 14.0 - 18.0 g/dL    Hematocrit 46.0 40.0 - 54.0 %    MCV 88 80 - 100 fL    MCH 30.7 27.0 - 34.0 pg    MCHC 35.0 32.0 - 36.0 g/dL    RDW 12.1 11.0 - 14.5 %    Platelets 198 140 - 440 thou/uL    MPV 8.2 7.0 - 10.0 fL   Basic Metabolic Panel   Result Value Ref Range    Sodium 143 136 - 145 mmol/L    Potassium 3.9 3.5 - 5.0 mmol/L    Chloride 107 98 - 107 mmol/L    CO2 27 22 - 31 mmol/L    Anion Gap, Calculation 9 5 - 18 mmol/L    Glucose 88 70 - 125 mg/dL    Calcium 9.4 8.5 - 10.5 mg/dL    BUN 20 8 - 22 mg/dL    Creatinine 1.08 0.70 - 1.30 mg/dL    GFR MDRD Af Amer >60 >60 mL/min/1.73m2    GFR MDRD Non Af Amer >60 >60 mL/min/1.73m2    Narrative    Fasting Glucose reference range is 70-99 mg/dL per  American Diabetes Association (ADA) guidelines.   PSA (Prostatic-Specific Antigen), Annual Screen   Result Value Ref Range    PSA 0.5 0.0 - 2.5 ng/mL    Narrative    Method is Abbott Prostate-Specific Antigen (PSA)  Standard-WHO 1st International (90:10)         Juan Carlos, your laboratory results are normal.  That is good news.  Thank you for coming in to visit.  We should visit again in 1 year.      Sincerely,        BETH Hernandez MD, MINNIE  [facility]  [phone]

## 2021-06-21 NOTE — LETTER
Letter by Enrique Mcdaniels MD at      Author: Enrique Mcdaniels MD Service: -- Author Type: --    Filed:  Encounter Date: 2/15/2021 Status: (Other)         February 15, 2021     Patient: Alfredo Velasquez   YOB: 1975   Date of Visit: 2/15/2021       To Whom it May Concern:    Alfredo Velasquez was seen in my clinic on 2/15/2021.  Please excuse patient from work 2/14/21 through 2/17/2021 because of an injury to his left shoulder. Rest has been helpful and I anticipate being able to return to work as scheduled on 2/20/2021without restrictions.    If you have any questions or concerns, please don't hesitate to call.    Sincerely,         Electronically signed by Enrique Mcdaniels MD

## 2021-06-22 NOTE — PROGRESS NOTES
DATE OF SERVICE: 12/12/2018    The patient comes in today for multiple issues.  The first need for antibiotics.  He  is going to have dental cleaning done.  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  It was done at the [facility], old records are not available.       REVIEW OF SYSTEMS:  Negative for chest pain or shortness of breath.  He has never  had infection of the graft.    He also has elevated blood pressure.  Old blood pressures from the last visits were  reviewed showing they are borderline today; it is at 148/88.  He feels somewhat  nervous being in the exam room.  He also notes his weight is up to 269.    Socially, he does not smoke.    He has KRAIG and that has been treated with CPAP machine and going smoothly.      OBJECTIVE:  VITAL SIGNS:  148/88, pulse is a 72, respirations 16.    GENERAL:  He alert, conversant, in no acute distress.  SKIN:  Pink and dry.    ASSESSMENT:  1.  Elevated blood pressure.  2.  Obstructive sleep apnea.  3.  Coarctation of the aorta repair.    PLAN:  1.  Started amoxicillin 2 grams 1 hour before dental procedure.    2.  Discussed blood pressure in detail and he will take serial blood pressures and  return if trending above 140/90.  3.  Encouraged weight loss.  We set a goal to 40.  4.  Encourage exercise.  5.  Continue CPAP.  6.  Will see back for regular visits.      MD ganga GARCIA 12/12/2018 13:35:46  T 12/12/2018 15:32:35  R 12/12/2018 15:32:35  [id number]        cc:HANY MÉNDEZ MD

## 2021-06-27 ENCOUNTER — HEALTH MAINTENANCE LETTER (OUTPATIENT)
Age: 46
End: 2021-06-27

## 2021-07-03 NOTE — ADDENDUM NOTE
Addendum Note by Gilda Hernandez MD at 7/2/2020  9:18 AM     Author: Gilda Hernandez MD Service: -- Author Type: Physician    Filed: 7/2/2020  9:18 AM Encounter Date: 6/29/2020 Status: Signed    : Gilda Hernandez MD (Physician)    Addended by: GILDA HERNANDEZ on: 7/2/2020 09:18 AM        Modules accepted: Orders

## 2021-07-05 ENCOUNTER — COMMUNICATION - HEALTHEAST (OUTPATIENT)
Dept: FAMILY MEDICINE | Facility: CLINIC | Age: 46
End: 2021-07-05

## 2021-07-05 DIAGNOSIS — I15.9 SECONDARY HYPERTENSION: ICD-10-CM

## 2021-07-05 RX ORDER — LISINOPRIL 20 MG/1
TABLET ORAL
Qty: 90 TABLET | Refills: 1 | Status: SHIPPED | OUTPATIENT
Start: 2021-07-05 | End: 2022-01-03

## 2021-07-05 NOTE — TELEPHONE ENCOUNTER
Telephone Encounter by Lore Mckay RN at 7/5/2021 10:56 AM     Author: Lore Mckay RN Service: -- Author Type: Registered Nurse    Filed: 7/5/2021 10:57 AM Encounter Date: 7/5/2021 Status: Signed    : Lore Mckay RN (Registered Nurse)       Refill Approved    Rx renewed per Medication Renewal Policy.   Robles Rodriguez Connection Triage/Med Refill 7/5/2021     Requested Prescriptions   Pending Prescriptions Disp Refills   ? lisinopriL (PRINIVIL,ZESTRIL) 20 MG tablet [Pharmacy Med Name: LISINOPRIL 20MG TABS] 90 tablet 3     Sig: TAKE ONE TABLET BY MOUTH EVERY DAY       Ace Inhibitors Refill Protocol Passed - 7/5/2021  4:19 AM        Passed - PCP or prescribing provider visit in past 12 months       Last office visit with prescriber/PCP: 5/14/2019 Gilda Hernandez MD OR same dept: 2/15/2021 Enrique Mcdaniels MD OR same specialty: 2/15/2021 Enrique Mcdaniels MD  Last physical: 7/8/2020 Last MTM visit: Visit date not found   Next visit within 3 mo: Visit date not found  Next physical within 3 mo: Visit date not found  Prescriber OR PCP: Gilda Hernandez MD  Last diagnosis associated with med order: 1. Secondary hypertension  - lisinopriL (PRINIVIL,ZESTRIL) 20 MG tablet [Pharmacy Med Name: LISINOPRIL 20MG TABS]; TAKE ONE TABLET BY MOUTH EVERY DAY  Dispense: 90 tablet; Refill: 3    If protocol passes may refill for 12 months if within 3 months of last provider visit (or a total of 15 months).             Passed - Serum Potassium in past 12 months     Lab Results   Component Value Date    Potassium 4.1 12/16/2020             Passed - Blood pressure filed in past 12 months     BP Readings from Last 1 Encounters:   02/15/21 128/88             Passed - Serum Creatinine in past 12 months     Creatinine   Date Value Ref Range Status   12/16/2020 1.02 0.70 - 1.30 mg/dL Final

## 2021-08-04 ENCOUNTER — TELEPHONE (OUTPATIENT)
Dept: FAMILY MEDICINE | Facility: CLINIC | Age: 46
End: 2021-08-04

## 2021-08-04 DIAGNOSIS — Z79.2 PROPHYLACTIC ANTIBIOTIC: Primary | ICD-10-CM

## 2021-08-04 NOTE — TELEPHONE ENCOUNTER
Reason for Call:  Medication or medication refill:    Do you use a [facility]?  Name of the pharmacy and phone number for the current request:  [facility]    Name of the medication requested: penicillin for a dental appt due to having core tasion of the aortic valve when he was 5  His appt is set for the 23 of aug/ usually takes 2-3 before he goes    Other request:     Can we leave a detailed message on this number? YES    Phone number patient can be reached at: Cell number on file:    Telephone Information:   Mobile [phone]       Best Time:     Call taken on 8/4/2021 at 1:20 PM by Latricia Greer

## 2021-08-05 RX ORDER — AMOXICILLIN 500 MG/1
2000 CAPSULE ORAL ONCE
Qty: 4 CAPSULE | Refills: 0 | Status: SHIPPED | OUTPATIENT
Start: 2021-08-05 | End: 2021-08-05

## 2021-08-05 NOTE — TELEPHONE ENCOUNTER
Prescription for amoxicillin sent to the pharmacy.  I typically send in a prescription for 2 g(4 tabs) 1 hour prior to surgery.  Looks like this is what Dr. Hernandez did in the past as well.    Nilson Gooden, CNP

## 2021-08-22 ENCOUNTER — HEALTH MAINTENANCE LETTER (OUTPATIENT)
Age: 46
End: 2021-08-22

## 2021-10-17 ENCOUNTER — HEALTH MAINTENANCE LETTER (OUTPATIENT)
Age: 46
End: 2021-10-17

## 2022-01-03 DIAGNOSIS — I15.9 SECONDARY HYPERTENSION: ICD-10-CM

## 2022-01-03 RX ORDER — LISINOPRIL 20 MG/1
20 TABLET ORAL DAILY
Qty: 90 TABLET | Refills: 0 | Status: SHIPPED | OUTPATIENT
Start: 2022-01-03 | End: 2022-03-17

## 2022-01-03 NOTE — TELEPHONE ENCOUNTER
Medication: lisinopril 20 MG tablet   Last Date Filled: 7/5/21  Last appointment addressing medication: 2/15/21  Last B/P:  BP Readings from Last 3 Encounters:   02/15/21 128/88   02/12/21 (!) 182/94   07/08/20 138/80     Last labs pertaining to refill: N/A      Pend medication and associate diagnosis before routing to Provider for review.       If patient has not been seen in over 1 year, pend 30 day supply and notify patient they are due for an appointment before any further refills.

## 2022-05-18 ENCOUNTER — OFFICE VISIT (OUTPATIENT)
Dept: FAMILY MEDICINE | Facility: CLINIC | Age: 47
End: 2022-05-18
Payer: COMMERCIAL

## 2022-05-18 VITALS
WEIGHT: 269 LBS | HEIGHT: 69 IN | TEMPERATURE: 98.2 F | BODY MASS INDEX: 39.84 KG/M2 | SYSTOLIC BLOOD PRESSURE: 128 MMHG | DIASTOLIC BLOOD PRESSURE: 80 MMHG | OXYGEN SATURATION: 98 % | HEART RATE: 74 BPM | RESPIRATION RATE: 14 BRPM

## 2022-05-18 DIAGNOSIS — Z11.4 SCREENING FOR HIV WITHOUT PRESENCE OF RISK FACTORS: ICD-10-CM

## 2022-05-18 DIAGNOSIS — R73.9 HYPERGLYCEMIA: ICD-10-CM

## 2022-05-18 DIAGNOSIS — E66.01 MORBID OBESITY (H): ICD-10-CM

## 2022-05-18 DIAGNOSIS — E78.00 PURE HYPERCHOLESTEROLEMIA: ICD-10-CM

## 2022-05-18 DIAGNOSIS — Z11.59 ENCOUNTER FOR HEPATITIS C SCREENING TEST FOR LOW RISK PATIENT: ICD-10-CM

## 2022-05-18 DIAGNOSIS — G47.33 OSA (OBSTRUCTIVE SLEEP APNEA): ICD-10-CM

## 2022-05-18 DIAGNOSIS — I10 BENIGN ESSENTIAL HYPERTENSION: Primary | ICD-10-CM

## 2022-05-18 LAB
ALBUMIN SERPL-MCNC: 4.1 G/DL (ref 3.5–5)
ALP SERPL-CCNC: 108 U/L (ref 45–120)
ALT SERPL W P-5'-P-CCNC: 27 U/L (ref 0–45)
ANION GAP SERPL CALCULATED.3IONS-SCNC: 9 MMOL/L (ref 5–18)
AST SERPL W P-5'-P-CCNC: 23 U/L (ref 0–40)
BILIRUB SERPL-MCNC: 0.5 MG/DL (ref 0–1)
BUN SERPL-MCNC: 15 MG/DL (ref 8–22)
CALCIUM SERPL-MCNC: 9 MG/DL (ref 8.5–10.5)
CHLORIDE BLD-SCNC: 105 MMOL/L (ref 98–107)
CHOLEST SERPL-MCNC: 229 MG/DL
CO2 SERPL-SCNC: 28 MMOL/L (ref 22–31)
CREAT SERPL-MCNC: 0.97 MG/DL (ref 0.7–1.3)
FASTING STATUS PATIENT QL REPORTED: ABNORMAL
GFR SERPL CREATININE-BSD FRML MDRD: >90 ML/MIN/1.73M2
GLUCOSE BLD-MCNC: 93 MG/DL (ref 70–125)
HBA1C MFR BLD: 5.8 % (ref 0–5.6)
HDLC SERPL-MCNC: 38 MG/DL
HIV 1+2 AB+HIV1 P24 AG SERPL QL IA: NEGATIVE
LDLC SERPL CALC-MCNC: 136 MG/DL
POTASSIUM BLD-SCNC: 4 MMOL/L (ref 3.5–5)
PROT SERPL-MCNC: 6.9 G/DL (ref 6–8)
SODIUM SERPL-SCNC: 142 MMOL/L (ref 136–145)
TRIGL SERPL-MCNC: 275 MG/DL

## 2022-05-18 PROCEDURE — 99214 OFFICE O/P EST MOD 30 MIN: CPT | Performed by: FAMILY MEDICINE

## 2022-05-18 PROCEDURE — 83036 HEMOGLOBIN GLYCOSYLATED A1C: CPT | Performed by: FAMILY MEDICINE

## 2022-05-18 PROCEDURE — 80061 LIPID PANEL: CPT | Performed by: FAMILY MEDICINE

## 2022-05-18 PROCEDURE — 87389 HIV-1 AG W/HIV-1&-2 AB AG IA: CPT | Performed by: FAMILY MEDICINE

## 2022-05-18 PROCEDURE — 36415 COLL VENOUS BLD VENIPUNCTURE: CPT | Performed by: FAMILY MEDICINE

## 2022-05-18 PROCEDURE — 80053 COMPREHEN METABOLIC PANEL: CPT | Performed by: FAMILY MEDICINE

## 2022-05-18 PROCEDURE — 86803 HEPATITIS C AB TEST: CPT | Performed by: FAMILY MEDICINE

## 2022-05-18 ASSESSMENT — PAIN SCALES - GENERAL: PAINLEVEL: NO PAIN (0)

## 2022-05-19 LAB — HCV AB SERPL QL IA: NONREACTIVE

## 2022-05-19 NOTE — PROGRESS NOTES
ASSESSMENT/PLAN:       1. Benign essential hypertension  Blood pressure is controlled and the patient will continue on lisinopril 20 mg daily.    - Comprehensive metabolic panel, GFR is greater than 90 with normal electrolytes    2. Essential Hypercholesterolemia    - Lipid panel reflex to direct LDL Non-fasting, 229/275/38/136    The 10-year ASCVD risk score (Jayy MASTERSON Jr., et al., 2013) is: 4.6%    Values used to calculate the score:      Age: 46 years      Sex: Male      Is Non- : No      Diabetic: No      Tobacco smoker: No      Systolic Blood Pressure: 128 mmHg      Is BP treated: Yes      HDL Cholesterol: 38 mg/dL      Total Cholesterol: 229 mg/dL   The patient's 10-year risk of having a cardiovascular event is 4.6% which is at a lower level and we will not start a statin at this point.  The patient has mixed hyperlipidemia now with triglycerides elevated and will stressed to him the importance of weight loss and modifying his diet with fewer simple sugars and carbohydrates    3. Morbid obesity (H)    - Comprehensive metabolic panel, blood glucose was 93 with normal liver function tests    - Hemoglobin A1c, 5.8 which is in the prediabetes range up from 5.5  3 years ago    4. Encounter for hepatitis C screening test for low risk patient    - Hepatitis C Screen Reflex to HCV RNA Quant and Genotype, pending    5. Screening for HIV without presence of risk factors    - HIV Antigen Antibody Combo, negative    6. Hyperglycemia    - Hemoglobin A1c, 5.8    7. KRAIG (obstructive sleep apnea)  - Adult Sleep Eval & Management  Referral; Future  Placed a referral for sleep medicine consultation to assist the patient with is concerned about the CPAP pressures not being adequate.  Also needs new supplies.  Currently has the ResMed Ramsey CPAP.    Test results by [facility]  Follow-up 6 months for preventative healthcare visit    No immunizations due    Enrique Mcdaniels MD      PROGRESS NOTE    5/19/2022    SUBJECTIVE:  [id number]  who presents for   Chief Complaint   Patient presents with     Recheck Medication     Med check and refills      The patient is seen today for a medication follow-up visit.  The patient has a history of hypertension and his blood pressure is controlled today.  He is on lisinopril 20 mg daily.  He does not have any side effects from his lisinopril.    Hyperlipidemia with his last LDL of 162.  He has tried to change his diet and has not gone on a statin.  The patient is on aspirin 81 mg daily    Morbid obesity is tied to diagnoses of hyperlipidemia and hypertension.  Also contributes to the patient's obstructive sleep apnea.  BMI is 39.7  Weight is not changed significantly from a year ago.    Screening for hepatitis C and HIV is needed    Would like to do screening for diabetes because of hyperglycemia    The patient has a ResMed Ramsey CPAP machine which apparently has not been recalled but he needs some new equipment and he is concerned with weight gain since this was last been checked that the pressures may need to be adjusted.  He would like a referral to an [facility] sleep medicine specialist.    The patient is due for colon cancer screening with the new guidelines to start at age 45 and we discussed that.  No family history of colon cancer.  He is going to check with his insurance to see if it is covered at his age.  If so he will proceed with a colonoscopy.    Patient Active Problem List   Diagnosis     Essential Hypercholesterolemia     Foot Pain (Soft Tissue)     Pes Planus     Acute Sore Throat     High blood pressure     Obesity (BMI 35.0-39.9) with comorbidity (H)       Current Outpatient Medications   Medication Sig Dispense Refill     aspirin 81 mg chewable tablet [ASPIRIN 81 MG CHEWABLE TABLET] Chew.       fexofenadine (ALLEGRA) 180 MG tablet [FEXOFENADINE (ALLEGRA) 180 MG TABLET] Take 180 mg by mouth daily.       lisinopriL (PRINIVIL,ZESTRIL) 20 MG tablet  "[LISINOPRIL (PRINIVIL,ZESTRIL) 20 MG TABLET] Take 1 tablet (20 mg total) by mouth daily. 90 tablet 3       History   Smoking Status     Never Smoker   Smokeless Tobacco     Never Used       ROS:  Answers for HPI/ROS submitted by the patient on 5/18/2022  Do you check your blood pressure regularly outside of the clinic?: No  Are your blood pressures ever more than 140 on the top number (systolic) OR more than 90 on the bottom number (diastolic)? (For example, greater than 140/90): Yes  Are you following a low salt diet?: Yes        OBJECTIVE:      Recent Results (from the past 48 hour(s))   HIV Antigen Antibody Combo    Collection Time: 05/18/22  3:32 PM   Result Value Ref Range    HIV Antigen Antibody Combo Negative Negative   Comprehensive metabolic panel    Collection Time: 05/18/22  3:32 PM   Result Value Ref Range    Sodium 142 136 - 145 mmol/L    Potassium 4.0 3.5 - 5.0 mmol/L    Chloride 105 98 - 107 mmol/L    Carbon Dioxide (CO2) 28 22 - 31 mmol/L    Anion Gap 9 5 - 18 mmol/L    Urea Nitrogen 15 8 - 22 mg/dL    Creatinine 0.97 0.70 - 1.30 mg/dL    Calcium 9.0 8.5 - 10.5 mg/dL    Glucose 93 70 - 125 mg/dL    Alkaline Phosphatase 108 45 - 120 U/L    AST 23 0 - 40 U/L    ALT 27 0 - 45 U/L    Protein Total 6.9 6.0 - 8.0 g/dL    Albumin 4.1 3.5 - 5.0 g/dL    Bilirubin Total 0.5 0.0 - 1.0 mg/dL    GFR Estimate >90 >60 mL/min/1.73m2   Lipid panel reflex to direct LDL Non-fasting    Collection Time: 05/18/22  3:32 PM   Result Value Ref Range    Cholesterol 229 (H) <=199 mg/dL    Triglycerides 275 (H) <=149 mg/dL    Direct Measure HDL 38 (L) >=40 mg/dL    LDL Cholesterol Calculated 136 (H) <=129 mg/dL    Patient Fasting > 8hrs? Unknown    Hemoglobin A1c    Collection Time: 05/18/22  3:32 PM   Result Value Ref Range    Hemoglobin A1C 5.8 (H) 0.0 - 5.6 %       Vitals:    05/18/22 1458   BP: 128/80   Pulse: 74   Resp: 14   Temp: 98.2  F (36.8  C)   SpO2: 98%   Weight: 122 kg (269 lb)   Height: 1.753 m (5' 9\")     Wt " Readings from Last 3 Encounters:   05/18/22 122 kg (269 lb)   02/15/21 120.2 kg (265 lb)   02/12/21 122 kg (269 lb)           Physical Exam:  GENERAL APPEARANCE: Pleasant 46-year-old male, NAD, well hydrated, well nourished  SKIN:  Normal skin turgor, no lesions/rashes   HEENT: moist mucous membranes, no rhinorrhea  NECK: Normal without adenopathy or masses  CV: RRR, no M/G/R   LUNGS: CTAB  ABDOMEN: S&NT, no masses or enlarged organs   EXTREMITY: no edema and full ROM of all joints  NEURO: no focal findings

## 2022-05-22 NOTE — RESULT ENCOUNTER NOTE
Juan Carlos,  It was nice to see you in the clinic this past week.  Your test results are back and I would like to go over those results for you.    As we discussed I included the routine screening for hepatitis C and HIV and both of those were negative.  No further follow-up needed on those tests.    The metabolic panel showed that your electrolytes were normal including your calcium.  The urea nitrogen, creatinine and GFR look at your kidney function which is normal.  The blood glucose is a screening for diabetes and 93 is a normal value.  The rest of the metabolic panel looks at your liver function all of which is normal.    The lipids particularly the total cholesterol and triglycerides are elevated from last time.  The total cholesterol should be less than 200 and it is 229 and the triglycerides should be less than 150 and the value was 275.  The HDL cholesterol is the good cholesterol and 38 is on the low side.  The LDL is the bad cholesterol and would like to see that less than 130 and closer to 100.    Hemoglobin A1c is another test that looks at diabetes and its in the prediabetes range at 5.8.  Diabetes is when the A1c is 6.5 or higher and a normal A1c is 5.6 or lower.    In summary what is most important to improve your lipids and prevent diabetes is weight loss.  I realize that weight loss is not easy but even a 10-20 pound weight loss can have a significant improvement in your triglycerides and help prevent the progression to diabetes mellitus.  A diet that is more vegetable based and less meat based is best.  Also limiting your simple sugars and carbohydrates is important.  Whole grains are fine and if you like fish salmon it is a healthy fish.    I do not feel that you need to start any medication for your lipids and would like for you to be able to make improvements with lifestyle changes.    If you have questions or if I can help in any other way please let me know.    Best regards,  Dr. Mcdaniels

## 2022-07-07 ENCOUNTER — MYC MEDICAL ADVICE (OUTPATIENT)
Dept: FAMILY MEDICINE | Facility: CLINIC | Age: 47
End: 2022-07-07

## 2022-07-07 ENCOUNTER — MYC REFILL (OUTPATIENT)
Dept: FAMILY MEDICINE | Facility: CLINIC | Age: 47
End: 2022-07-07

## 2022-07-07 DIAGNOSIS — I15.9 SECONDARY HYPERTENSION: ICD-10-CM

## 2022-07-08 RX ORDER — LISINOPRIL 20 MG/1
20 TABLET ORAL DAILY
Qty: 90 TABLET | Refills: 2 | Status: SHIPPED | OUTPATIENT
Start: 2022-07-08 | End: 2023-03-24

## 2022-07-08 NOTE — TELEPHONE ENCOUNTER
Patient out of medication. Routing back to erx pool high priority.  Letitia Loaiza RN on 7/8/2022 at 8:49 AM

## 2022-07-08 NOTE — TELEPHONE ENCOUNTER
"Last Written Prescription Date:  7/8/20  Last Fill Quantity: 90,  # refills: 3   Last office visit provider:  5/18/22     Requested Prescriptions   Pending Prescriptions Disp Refills     lisinopril (ZESTRIL) 20 MG tablet 90 tablet 3     Sig: Take 1 tablet (20 mg) by mouth daily       ACE Inhibitors (Including Combos) Protocol Passed - 7/8/2022  8:49 AM        Passed - Blood pressure under 140/90 in past 12 months     BP Readings from Last 3 Encounters:   05/18/22 128/80   02/15/21 128/88   02/12/21 (!) 182/94                 Passed - Recent (12 mo) or future (30 days) visit within the authorizing provider's specialty     Patient has had an office visit with the authorizing provider or a provider within the authorizing providers department within the previous 12 mos or has a future within next 30 days. See \"Patient Info\" tab in inbasket, or \"Choose Columns\" in Meds & Orders section of the refill encounter.              Passed - Medication is active on med list        Passed - Patient is age 18 or older        Passed - Normal serum creatinine on file in past 12 months     Recent Labs   Lab Test 05/18/22  1532   CR 0.97       Ok to refill medication if creatinine is low          Passed - Normal serum potassium on file in past 12 months     Recent Labs   Lab Test 05/18/22  1532   POTASSIUM 4.0                  Indy Lloyd, RN 07/08/22 10:24 AM  "

## 2022-07-08 NOTE — TELEPHONE ENCOUNTER
Medical message sent to patient resent rx request high priority.  Letitia Loaiza RN on 7/8/2022 at 8:50 AM

## 2022-10-01 ENCOUNTER — HEALTH MAINTENANCE LETTER (OUTPATIENT)
Age: 47
End: 2022-10-01

## 2022-10-24 ENCOUNTER — OFFICE VISIT (OUTPATIENT)
Dept: FAMILY MEDICINE | Facility: CLINIC | Age: 47
End: 2022-10-24
Payer: COMMERCIAL

## 2022-10-24 VITALS
DIASTOLIC BLOOD PRESSURE: 86 MMHG | HEART RATE: 81 BPM | WEIGHT: 259.9 LBS | OXYGEN SATURATION: 96 % | BODY MASS INDEX: 38.38 KG/M2 | RESPIRATION RATE: 14 BRPM | SYSTOLIC BLOOD PRESSURE: 154 MMHG

## 2022-10-24 DIAGNOSIS — N50.812 PAIN IN LEFT TESTICLE: Primary | ICD-10-CM

## 2022-10-24 LAB
ALBUMIN UR-MCNC: NEGATIVE MG/DL
ANION GAP SERPL CALCULATED.3IONS-SCNC: 12 MMOL/L (ref 7–15)
APPEARANCE UR: CLEAR
BACTERIA #/AREA URNS HPF: ABNORMAL /HPF
BILIRUB UR QL STRIP: NEGATIVE
BUN SERPL-MCNC: 21.7 MG/DL (ref 6–20)
CALCIUM SERPL-MCNC: 8.9 MG/DL (ref 8.6–10)
CHLORIDE SERPL-SCNC: 106 MMOL/L (ref 98–107)
COLOR UR AUTO: YELLOW
CREAT SERPL-MCNC: 1.12 MG/DL (ref 0.67–1.17)
DEPRECATED HCO3 PLAS-SCNC: 25 MMOL/L (ref 22–29)
ERYTHROCYTE [DISTWIDTH] IN BLOOD BY AUTOMATED COUNT: 11.8 % (ref 10–15)
GFR SERPL CREATININE-BSD FRML MDRD: 82 ML/MIN/1.73M2
GLUCOSE SERPL-MCNC: 109 MG/DL (ref 70–99)
GLUCOSE UR STRIP-MCNC: NEGATIVE MG/DL
HCT VFR BLD AUTO: 44.8 % (ref 40–53)
HGB BLD-MCNC: 15.4 G/DL (ref 13.3–17.7)
HGB UR QL STRIP: ABNORMAL
HYALINE CASTS #/AREA URNS LPF: ABNORMAL /LPF
KETONES UR STRIP-MCNC: NEGATIVE MG/DL
LEUKOCYTE ESTERASE UR QL STRIP: NEGATIVE
MCH RBC QN AUTO: 29.6 PG (ref 26.5–33)
MCHC RBC AUTO-ENTMCNC: 34.4 G/DL (ref 31.5–36.5)
MCV RBC AUTO: 86 FL (ref 78–100)
MUCOUS THREADS #/AREA URNS LPF: PRESENT /LPF
NITRATE UR QL: NEGATIVE
PH UR STRIP: 5.5 [PH] (ref 5–8)
PLATELET # BLD AUTO: 178 10E3/UL (ref 150–450)
POTASSIUM SERPL-SCNC: 3.6 MMOL/L (ref 3.4–5.3)
PSA SERPL-MCNC: 0.69 NG/ML (ref 0–2.5)
RBC # BLD AUTO: 5.21 10E6/UL (ref 4.4–5.9)
RBC #/AREA URNS AUTO: ABNORMAL /HPF
SODIUM SERPL-SCNC: 143 MMOL/L (ref 136–145)
SP GR UR STRIP: >=1.03 (ref 1–1.03)
SQUAMOUS #/AREA URNS AUTO: ABNORMAL /LPF
UROBILINOGEN UR STRIP-ACNC: 0.2 E.U./DL
WBC # BLD AUTO: 8.5 10E3/UL (ref 4–11)
WBC #/AREA URNS AUTO: ABNORMAL /HPF

## 2022-10-24 PROCEDURE — 36415 COLL VENOUS BLD VENIPUNCTURE: CPT | Performed by: PHYSICIAN ASSISTANT

## 2022-10-24 PROCEDURE — 81001 URINALYSIS AUTO W/SCOPE: CPT | Performed by: PHYSICIAN ASSISTANT

## 2022-10-24 PROCEDURE — G0103 PSA SCREENING: HCPCS | Performed by: PHYSICIAN ASSISTANT

## 2022-10-24 PROCEDURE — 85027 COMPLETE CBC AUTOMATED: CPT | Performed by: PHYSICIAN ASSISTANT

## 2022-10-24 PROCEDURE — 99214 OFFICE O/P EST MOD 30 MIN: CPT | Performed by: PHYSICIAN ASSISTANT

## 2022-10-24 PROCEDURE — 80048 BASIC METABOLIC PNL TOTAL CA: CPT | Performed by: PHYSICIAN ASSISTANT

## 2022-10-24 PROCEDURE — 87086 URINE CULTURE/COLONY COUNT: CPT | Performed by: PHYSICIAN ASSISTANT

## 2022-10-24 ASSESSMENT — ENCOUNTER SYMPTOMS
FATIGUE: 0
NUMBNESS: 1
BACK PAIN: 0
VOMITING: 0
LIGHT-HEADEDNESS: 0
HEADACHES: 0
SHORTNESS OF BREATH: 0
NAUSEA: 0
DYSURIA: 1
FREQUENCY: 1
CONFUSION: 0
FEVER: 0
CHILLS: 0
ABDOMINAL PAIN: 0
WEAKNESS: 0
DIARRHEA: 0
RECTAL PAIN: 0

## 2022-10-24 ASSESSMENT — PAIN SCALES - GENERAL: PAINLEVEL: MILD PAIN (3)

## 2022-10-24 NOTE — PROGRESS NOTES
"Chief Complaint   Patient presents with     Groin Pain     Groin pain and pain when ejaculating. Been off and on for a couple months. Pain has gotten a little better since it started but still painful. Pain started at work one day and pt thought it was a hernia type of thing.        Assessment & Plan       ICD-10-CM    1. Pain in left testicle  N50.812 UA reflex to Microscopic and Culture     CBC with platelets     Basic metabolic panel     PSA, screen     US Testicular & Scrotum w Doppler Ltd     UA reflex to Microscopic and Culture     CBC with platelets     Basic metabolic panel     PSA, screen     Urine Microscopic     Urine Culture Aerobic Bacterial - lab collect          #1 testicle pain  -PSA, CBC, BMP  -Urinalysis  -Testicular ultrasound  He has been having on and off testicle pain for about 1 to 2 months.  Pain is in his left testicle/scrotum and radiates into the left inguinal canal.  less likely suspect torsion at this time.  He does have a history of a left orchiopexy when he was a teenager.  He does have some scar tissue related to the surgery.  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  He also mentioned he does have some numbness along the left side of the scrotum and into the inguinal canal.  He is not having any fevers, chills, rectal pain, or penile discharge.  He is having some burning with urination and increase in frequency.  On physical exam today he is tender on the epididymis of the left testicle.  Epididymis does not feel enlarged.  No other obvious swelling.  There does not appear to be an inguinal hernia.  He does not have any pain with prolonged sitting.  Discussed further treatment plan based on test results.  Patient was agreeable to this plan.      BMI:   Estimated body mass index is 38.38 kg/m  as calculated from the following:    Height as of 5/18/22: 1.753 m (5' 9\").    Weight as of this encounter: 117.9 kg (259 lb 14.4 oz). "           No follow-ups on file.    [facility] is a 47 year old, presenting for the following health issues:  Groin Pain (Groin pain and pain when ejaculating. Been off and on for a couple months. Pain has gotten a little better since it started but still painful. Pain started at work one day and pt thought it was a hernia type of thing. )      Juan Carlos is a pleasant 47-year-old male presenting to the clinic today for evaluation of testicular pain. He states he was at work on 10/22/2022 and felt a popping/ pain sensation in the groain region.  The pain is been on and off for 1 to 2 months.  The pain is in his testicle/left scrotum and radiates into the left inguinal canal.  He does have a history of left orchiopexy when he was a teenager.  He does have some scar tissue related to the surgery.  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  He is not having any fevers or chills.  Did not have any rectal pain or penile discharge.  He is having some burning with urination on and off and having some frequency.  Only sexually active with 1 partner.    History of Present Illness       Reason for visit:  Pain during intercourse    He eats 2-3 servings of fruits and vegetables daily.He consumes 2 sweetened beverage(s) daily.He exercises with enough effort to increase his heart rate 20 to 29 minutes per day.  He exercises with enough effort to increase his heart rate 3 or less days per week. He is missing 1 dose(s) of medications per week.  He is not taking prescribed medications regularly due to remembering to take.             Review of Systems   Constitutional: Negative for chills, fatigue and fever.   Respiratory: Negative for shortness of breath.    Cardiovascular: Negative for chest pain.   Gastrointestinal: Negative for abdominal pain, diarrhea, nausea, rectal pain and vomiting.    Genitourinary: Positive for dysuria, frequency and testicular pain. Negative for penile discharge, penile pain, penile swelling and scrotal swelling.   Musculoskeletal: Negative for back pain.   Skin: Negative for rash.   Neurological: Positive for numbness. Negative for weakness, light-headedness and headaches.   Psychiatric/Behavioral: Negative for confusion.            Objective    BP (!) 154/86 (BP Location: Left arm, Patient Position: Sitting, Cuff Size: Adult Regular)   Pulse 81   Resp 14   Wt 117.9 kg (259 lb 14.4 oz)   SpO2 96%   BMI 38.38 kg/m    Body mass index is 38.38 kg/m .  Physical Exam  Vitals and nursing note reviewed.   Constitutional:       General: He is not in acute distress.     Appearance: Normal appearance. He is not ill-appearing, toxic-appearing or diaphoretic.   HENT:      Head: Normocephalic and atraumatic.   Eyes:      Conjunctiva/sclera: Conjunctivae normal.   Cardiovascular:      Rate and Rhythm: Normal rate and regular rhythm.      Heart sounds: No murmur heard.    No friction rub. No gallop.   Pulmonary:      Effort: Pulmonary effort is normal.      Breath sounds: No wheezing, rhonchi or rales.   Abdominal:      Hernia: There is no hernia in the left inguinal area or right inguinal area.   Genitourinary:     Penis: Normal.       Testes:         Right: Mass, tenderness, swelling, testicular hydrocele or varicocele not present. Right testis is descended. Cremasteric reflex is present.          Left: Tenderness present. Mass, swelling, testicular hydrocele or varicocele not present. Left testis is descended. Cremasteric reflex is present.       Epididymis:      Right: Normal.      Left: Normal.   Musculoskeletal:      Cervical back: Neck supple.   Neurological:      Mental Status: He is alert.          I assessed this patient in the clinic with Pedro BHAT student.  I agree with the above note which summarizes my findings and current recommendations. I have reviewed all  diagnostics noted and performed physical exam. Changes were made in the body of the note to achieve one comprehensive document.

## 2022-10-25 ENCOUNTER — HOSPITAL ENCOUNTER (OUTPATIENT)
Dept: ULTRASOUND IMAGING | Facility: CLINIC | Age: 47
Discharge: HOME | End: 2022-10-25
Attending: PHYSICIAN ASSISTANT | Admitting: PHYSICIAN ASSISTANT
Payer: COMMERCIAL

## 2022-10-25 DIAGNOSIS — N50.812 PAIN IN LEFT TESTICLE: ICD-10-CM

## 2022-10-25 PROCEDURE — 76870 US EXAM SCROTUM: CPT

## 2022-10-26 ENCOUNTER — HOSPITAL ENCOUNTER (OUTPATIENT)
Dept: ULTRASOUND IMAGING | Facility: CLINIC | Age: 47
Discharge: HOME | End: 2022-10-26
Attending: PHYSICIAN ASSISTANT | Admitting: PHYSICIAN ASSISTANT
Payer: COMMERCIAL

## 2022-10-26 DIAGNOSIS — K40.90 NON-RECURRENT UNILATERAL INGUINAL HERNIA WITHOUT OBSTRUCTION OR GANGRENE: Primary | ICD-10-CM

## 2022-10-26 DIAGNOSIS — R31.9 HEMATURIA, UNSPECIFIED TYPE: ICD-10-CM

## 2022-10-26 DIAGNOSIS — N50.812 PAIN IN LEFT TESTICLE: ICD-10-CM

## 2022-10-26 DIAGNOSIS — N50.812 PAIN IN LEFT TESTICLE: Primary | ICD-10-CM

## 2022-10-26 PROCEDURE — 76705 ECHO EXAM OF ABDOMEN: CPT

## 2022-10-27 LAB — BACTERIA UR CULT: NO GROWTH

## 2022-11-01 ENCOUNTER — LAB (OUTPATIENT)
Dept: LAB | Facility: CLINIC | Age: 47
End: 2022-11-01
Payer: COMMERCIAL

## 2022-11-01 DIAGNOSIS — R31.9 HEMATURIA, UNSPECIFIED TYPE: ICD-10-CM

## 2022-11-01 LAB
ALBUMIN UR-MCNC: NEGATIVE MG/DL
APPEARANCE UR: CLEAR
BACTERIA #/AREA URNS HPF: ABNORMAL /HPF
BILIRUB UR QL STRIP: NEGATIVE
COLOR UR AUTO: YELLOW
GLUCOSE UR STRIP-MCNC: NEGATIVE MG/DL
HGB UR QL STRIP: ABNORMAL
KETONES UR STRIP-MCNC: NEGATIVE MG/DL
LEUKOCYTE ESTERASE UR QL STRIP: NEGATIVE
MUCOUS THREADS #/AREA URNS LPF: PRESENT /LPF
NITRATE UR QL: NEGATIVE
PH UR STRIP: 6 [PH] (ref 5–8)
RBC #/AREA URNS AUTO: ABNORMAL /HPF
SP GR UR STRIP: >=1.03 (ref 1–1.03)
SQUAMOUS #/AREA URNS AUTO: ABNORMAL /LPF
UROBILINOGEN UR STRIP-ACNC: 0.2 E.U./DL
WBC #/AREA URNS AUTO: ABNORMAL /HPF

## 2022-11-01 PROCEDURE — 81001 URINALYSIS AUTO W/SCOPE: CPT

## 2022-11-04 NOTE — PATIENT INSTRUCTIONS
Hernia education & surgery packet provided to pt.    Darrick GARZA [facility]  Surgery Clinic [facility] [place]  [facility] Clinic - [address] [address]  Office: [phone]  Fax: [phone]

## 2022-11-09 ENCOUNTER — OFFICE VISIT (OUTPATIENT)
Dept: SURGERY | Facility: CLINIC | Age: 47
End: 2022-11-09
Payer: COMMERCIAL

## 2022-11-09 VITALS
HEIGHT: 69 IN | WEIGHT: 262 LBS | DIASTOLIC BLOOD PRESSURE: 98 MMHG | BODY MASS INDEX: 38.8 KG/M2 | SYSTOLIC BLOOD PRESSURE: 162 MMHG

## 2022-11-09 DIAGNOSIS — K40.90 NON-RECURRENT UNILATERAL INGUINAL HERNIA WITHOUT OBSTRUCTION OR GANGRENE: ICD-10-CM

## 2022-11-09 PROCEDURE — 99204 OFFICE O/P NEW MOD 45 MIN: CPT | Performed by: SURGERY

## 2022-11-09 NOTE — PROGRESS NOTES
"HPI:  Alfredo Velasquez is a 47 year old male who was referred to me by Shen Hung for left inguinal hernia.  Patient has had some left-sided groin discomfort for the last several months.  Over the last 2 weeks this is worsened significantly.  His work-up included an ultrasound which showed a hernia on that side.  He has noticed a bulge there.  Many of his complaints are urologic in nature including burning with urination.  He has a history of an orchiopexy on the left side.  He has had no issues with incarceration or bowel issues.    Allergies:Patient has no known allergies.    Past Medical History:   Diagnosis Date     Acute Sore Throat     Created by Conversion      Essential Hypercholesterolemia     Created by Conversion      Foot Pain (Soft Tissue)     Created by Conversion [facility] Annotation: Jul 18 2013 12:03PM - Chino Paul: for years      High blood pressure 12/7/2017     Obesity (BMI 35.0-39.9) with comorbidity (H) 2/16/2021     Pes Planus     Created by Conversion        Past Surgical History:   Procedure Laterality Date     AORTA SURGERY      Age: 5     TESTICLE SURGERY      Age: 12   Orchiopexy on the left side    Current Outpatient Medications   Medication Sig Dispense Refill     aspirin 81 mg chewable tablet [ASPIRIN 81 MG CHEWABLE TABLET] Chew.       fexofenadine (ALLEGRA) 180 MG tablet [FEXOFENADINE (ALLEGRA) 180 MG TABLET] Take 180 mg by mouth daily.       lisinopril (ZESTRIL) 20 MG tablet Take 1 tablet (20 mg) by mouth daily 90 tablet 2       Family History   Problem Relation Age of Onset     Hypertension Mother      Hypertension Father      Myocardial Infarction Father         reports that he has never smoked. He has never used smokeless tobacco. He reports current alcohol use. He reports that he does not use drugs.   Patient works at an industrial bakery    BP (!) 162/98   Ht 1.753 m (5' 9\")   Wt 118.8 kg (262 lb)   BMI 38.69 kg/m    Body mass index is 38.69 " kg/m .    EXAM:  GENERAL: Well developed male  HEENT: Pupils are round and reactive  NECK:  No obvious masses or deformities  CV: RRR  PULM: Lungs clear to auscultation bilaterally  ABDOMEN: Soft and nondistended.  Obese.  Nontender.  GROIN: He has a reducible left inguinal hernia.  The bulge is subtle in his subcutaneous fat.  I can palpate the defect.  NEURO: No obvious deficits noted.  EXT: No edema, no obvious deformities or any other abnormalities    IMAGES: Ultrasound personally reviewed  FINDINGS: A 1.7 cm diameter hernia defect left internal inguinal ring through which a 5 x 1.5 x 1.5 cm fat-containing hernia sac passes during Valsalva.                                                              Assessment/Plan:    Alfredo Velasquez is a 47 year old male with a left inguinal hernia.  This is causing him discomfort and merits repair.  We discussed various repair options.  I think a robotic approach would be best considering his current pain which puts him at higher risk for chronic pain and his body habitus.      We discussed the details of surgery.  We discussed the risks including bleeding, infection, and injury to surrounding structures.  We also discussed the incidence of recurrence.  We discussed the possibility of chronic pain.  I explained that some of his pain may get better but he may have some chronic pain even after the hernia is repaired.    The patient and his wife's questions were answered and he would like to proceed with surgery.  We will schedule this at his convenience.    Guero Mcgarry MD  General Surgeon  [facility]  Surgery [address]  [place] [address]?  Office: [phone]

## 2022-11-09 NOTE — LETTER
11/9/2022         RE: Alfredo Velasquez  [address] [place] [address]        Dear Colleague,    Thank you for referring your patient, Alfredo Velasquez, to the [facility] SUR[facility]ERY [facility] [place]. Please see a copy of my visit note below.    HPI:  Alfredo Velasquez is a 47 year old male who was referred to me by Shen Hung for left inguinal hernia.  Patient has had some left-sided groin discomfort for the last several months.  Over the last 2 weeks this is worsened significantly.  His work-up included an ultrasound which showed a hernia on that side.  He has noticed a bulge there.  Many of his complaints are urologic in nature including burning with urination.  He has a history of an orchiopexy on the left side.  He has had no issues with incarceration or bowel issues.    Allergies:Patient has no known allergies.    Past Medical History:   Diagnosis Date     Acute Sore Throat     Created by Conversion      Essential Hypercholesterolemia     Created by Conversion      Foot Pain (Soft Tissue)     Created by [facility] Annotation: Jul 18 2013 12:03PM - Chino Paul: for years      High blood pressure 12/7/2017     Obesity (BMI 35.0-39.9) with comorbidity (H) 2/16/2021     Pes Planus     Created by Conversion        Past Surgical History:   Procedure Laterality Date     AORTA SURGERY      Age: 5     TESTICLE SURGERY      Age: 12   Orchiopexy on the left side    Current Outpatient Medications   Medication Sig Dispense Refill     aspirin 81 mg chewable tablet [ASPIRIN 81 MG CHEWABLE TABLET] Chew.       fexofenadine (ALLEGRA) 180 MG tablet [FEXOFENADINE (ALLEGRA) 180 MG TABLET] Take 180 mg by mouth daily.       lisinopril (ZESTRIL) 20 MG tablet Take 1 tablet (20 mg) by mouth daily 90 tablet 2       Family History   Problem Relation Age of Onset     Hypertension Mother      Hypertension Father      Myocardial Infarction Father         reports that he has never  "smoked. He has never used smokeless tobacco. He reports current alcohol use. He reports that he does not use drugs.   Patient works at an industrial bakery    BP (!) 162/98   Ht 1.753 m (5' 9\")   Wt 118.8 kg (262 lb)   BMI 38.69 kg/m    Body mass index is 38.69 kg/m .    EXAM:  GENERAL: Well developed male  HEENT: Pupils are round and reactive  NECK:  No obvious masses or deformities  CV: RRR  PULM: Lungs clear to auscultation bilaterally  ABDOMEN: Soft and nondistended.  Obese.  Nontender.  GROIN: He has a reducible left inguinal hernia.  The bulge is subtle in his subcutaneous fat.  I can palpate the defect.  NEURO: No obvious deficits noted.  EXT: No edema, no obvious deformities or any other abnormalities    IMAGES: Ultrasound personally reviewed  FINDINGS: A 1.7 cm diameter hernia defect left internal inguinal ring through which a 5 x 1.5 x 1.5 cm fat-containing hernia sac passes during Valsalva.                                                              Assessment/Plan:    Alfredo Velasquez is a 47 year old male with a left inguinal hernia.  This is causing him discomfort and merits repair.  We discussed various repair options.  I think a robotic approach would be best considering his current pain which puts him at higher risk for chronic pain and his body habitus.      We discussed the details of surgery.  We discussed the risks including bleeding, infection, and injury to surrounding structures.  We also discussed the incidence of recurrence.  We discussed the possibility of chronic pain.  I explained that some of his pain may get better but he may have some chronic pain even after the hernia is repaired.    The patient and his wife's questions were answered and he would like to proceed with surgery.  We will schedule this at his convenience.    Guero Mcgarry MD  General Surgeon  [facility]  Surgery [address]  [address]  [place], [place] " [address]?  Office: [phone]        Again, thank you for allowing me to participate in the care of your patient.        Sincerely,        Guero Mcgarry MD

## 2022-11-10 ENCOUNTER — TELEPHONE (OUTPATIENT)
Dept: SURGERY | Facility: CLINIC | Age: 47
End: 2022-11-10

## 2022-11-10 NOTE — TELEPHONE ENCOUNTER
Spoke with patient today regarding surgery scheduling     Went over details/instructions.    Surgery Letter sent via [employer]

## 2022-11-10 NOTE — LETTER
We've received instruction to get you scheduled for surgery with Dr Mcgarry. We have that arranged as follows:     Pre-op Physical:  Call your primary clinic to schedule.    Surgery Date: 12/7/2022     Location: [facility], [address] [id number]    Approximate Arrival Time: 5:45 am  (Unless instructed differently by the pre-op call nurse)     [facility] op Appointment: 12/19/2022 [facility]00 pm [facility]                 [facility]                [address], [place] [address]       Prep Tasks and Info:     1. Schedule a pre-op physical with your primary care doctor within 30 days of surgery. This is required by anesthesia and if not done, your surgery will be cancelled. Call them asap to get this scheduled.    2. Review your medications with your primary care or prescribing physician; they will advise you which meds to stop and when, and when you can resume taking.  Certain medications like blood thinners need to be stopped in advance of surgery to proceed safely.    3. You must get tested for COVID-19, even if you are vaccinated.    Outpatient Surgery:  If you are going home the same day of surgery, a home rapid antigen Covid-19 test is required 1-2 days before surgery- regardless of your vaccination status.  Take a photo of the negative result and show to the nurse on the day of surgery. If you test positive, contact our office right away to reschedule surgery. You can buy a home Covid-19 Rapid Antigen test at many local pharmacies, or you can order for free at covid.gov/tests.    4. Please shower the evening before and morning of surgery with Hibiclens or Exidine soap.  This can be found at your local pharmacy.    5. Fasting instructions will be provided by the pre-op nurse who will call you 1-3 days before surgery.  Typically we advise normal food up to 8 hours before surgery then clear liquids only up to 1 hour before  surgery then nothing at all by mouth for 2 hours including no gum or candy.  The nurse will review your specific instructions with you at the call.      6. Smoking impacts your body's ability to heal properly.  If you are a smoker, we strongly urge you to stop smoking 4-6 weeks before surgery. Plastic surgery patients are required to be nicotine free for 6-8 weeks before surgery.     7. You will need an adult to drive you home and stay with you 24 hours after surgery. Public transportation or Medical Van Services are not permitted.    8. You may have two visitors wait in the lobby at the surgery center during your surgery. Visitor restrictions are subject to change, please verify with the pre-op nurse when they call.    9. If the community sees a new COVID19 surge, your procedure may need to be postponed. We will contact you if this happens. You will be screened for high-risk exposure to Covid-19 during the pre-op call.  We encourage you to quarantine yourself away from any Covid-19 people for 10 days before surgery to avoid possible last minute cancellations.   When you arrive to the surgery center, you will again be screened for COVID19 symptoms. If you screen positive, your surgery will need to be postponed.    10. We always encourage you to notify your insurance any time you have medical tests or procedures scheduled including surgery. The number is usually right on the back of your insurance card. Please call [facility] Cost of Care at [phone] if you'd like a surgery quote.       Call our office if you have any questions! Thank you!

## 2022-11-16 ENCOUNTER — TRANSFERRED RECORDS (OUTPATIENT)
Dept: HEALTH INFORMATION MANAGEMENT | Facility: CLINIC | Age: 47
End: 2022-11-16

## 2022-12-01 ENCOUNTER — TELEPHONE (OUTPATIENT)
Dept: SURGERY | Facility: CLINIC | Age: 47
End: 2022-12-01

## 2022-12-01 NOTE — TELEPHONE ENCOUNTER
Kristine called back and stated that Juan Carlos did have a pre op done at [facility] because he couldn't get in with his PCP in the amount of time needed. Kristine uploaded the documents to his [employer]. Notified the pre op nurse Grecia at .

## 2022-12-01 NOTE — TELEPHONE ENCOUNTER
Left a message for Kristine regarding a pre op physical for Juan Carlos. He will need to have this completed prior to surgery next week with  and our pre op nurse reached out today because she can't seem to find that it has been completed or scheduled.  Asked Kristine to make sure this was completed prior to surgery and contact me with any questions.

## 2022-12-06 ENCOUNTER — ANESTHESIA EVENT (OUTPATIENT)
Dept: SURGERY | Facility: CLINIC | Age: 47
End: 2022-12-06
Payer: COMMERCIAL

## 2022-12-06 RX ORDER — ASPIRIN 81 MG/1
81 TABLET ORAL DAILY
COMMUNITY

## 2022-12-07 ENCOUNTER — HOSPITAL ENCOUNTER (OUTPATIENT)
Facility: CLINIC | Age: 47
Discharge: HOME | End: 2022-12-07
Attending: SURGERY | Admitting: SURGERY
Payer: COMMERCIAL

## 2022-12-07 ENCOUNTER — ANESTHESIA (OUTPATIENT)
Dept: SURGERY | Facility: CLINIC | Age: 47
End: 2022-12-07
Payer: COMMERCIAL

## 2022-12-07 VITALS
WEIGHT: 263 LBS | DIASTOLIC BLOOD PRESSURE: 65 MMHG | SYSTOLIC BLOOD PRESSURE: 139 MMHG | BODY MASS INDEX: 38.95 KG/M2 | OXYGEN SATURATION: 95 % | HEART RATE: 58 BPM | HEIGHT: 69 IN | RESPIRATION RATE: 16 BRPM | TEMPERATURE: 97.6 F

## 2022-12-07 DIAGNOSIS — Z98.890 S/P INGUINAL HERNIA REPAIR: Primary | ICD-10-CM

## 2022-12-07 DIAGNOSIS — Z87.19 S/P INGUINAL HERNIA REPAIR: Primary | ICD-10-CM

## 2022-12-07 PROCEDURE — 49560 PR REPAIR INCISIONAL HERNIA,REDUCIBLE: CPT | Mod: LT | Performed by: SURGERY

## 2022-12-07 PROCEDURE — 250N000011 HC RX IP 250 OP 636: Performed by: SURGERY

## 2022-12-07 PROCEDURE — C1781 MESH (IMPLANTABLE): HCPCS | Performed by: SURGERY

## 2022-12-07 PROCEDURE — 250N000011 HC RX IP 250 OP 636: Performed by: NURSE ANESTHETIST, CERTIFIED REGISTERED

## 2022-12-07 PROCEDURE — 258N000003 HC RX IP 258 OP 636: Performed by: ANESTHESIOLOGY

## 2022-12-07 PROCEDURE — 250N000013 HC RX MED GY IP 250 OP 250 PS 637: Performed by: SURGERY

## 2022-12-07 PROCEDURE — 360N000080 HC SURGERY LEVEL 7, PER MIN: Performed by: SURGERY

## 2022-12-07 PROCEDURE — 710N000012 HC RECOVERY PHASE 2, PER MINUTE: Performed by: SURGERY

## 2022-12-07 PROCEDURE — 272N000001 HC OR GENERAL SUPPLY STERILE: Performed by: SURGERY

## 2022-12-07 PROCEDURE — 999N000141 HC STATISTIC PRE-PROCEDURE NURSING ASSESSMENT: Performed by: SURGERY

## 2022-12-07 PROCEDURE — 250N000025 HC SEVOFLURANE, PER MIN: Performed by: SURGERY

## 2022-12-07 PROCEDURE — 250N000009 HC RX 250: Performed by: NURSE ANESTHETIST, CERTIFIED REGISTERED

## 2022-12-07 PROCEDURE — 370N000017 HC ANESTHESIA TECHNICAL FEE, PER MIN: Performed by: SURGERY

## 2022-12-07 PROCEDURE — 250N000011 HC RX IP 250 OP 636: Performed by: ANESTHESIOLOGY

## 2022-12-07 PROCEDURE — 710N000010 HC RECOVERY PHASE 1, LEVEL 2, PER MIN: Performed by: SURGERY

## 2022-12-07 DEVICE — LAPAROSCOPIC SELF-FIXATING MESH POLYESTER WITH POLYLACTIC ACID GRIPS AND COLLAGEN FILM
Type: IMPLANTABLE DEVICE | Site: ABDOMEN | Status: FUNCTIONAL
Brand: PROGRIP

## 2022-12-07 RX ORDER — BUPIVACAINE HYDROCHLORIDE 2.5 MG/ML
INJECTION, SOLUTION INFILTRATION; PERINEURAL PRN
Status: DISCONTINUED | OUTPATIENT
Start: 2022-12-07 | End: 2022-12-07 | Stop reason: HOSPADM

## 2022-12-07 RX ORDER — NEOSTIGMINE METHYLSULFATE 1 MG/ML
VIAL (ML) INJECTION PRN
Status: DISCONTINUED | OUTPATIENT
Start: 2022-12-07 | End: 2022-12-07

## 2022-12-07 RX ORDER — FENTANYL CITRATE 50 UG/ML
25 INJECTION, SOLUTION INTRAMUSCULAR; INTRAVENOUS
Status: DISCONTINUED | OUTPATIENT
Start: 2022-12-07 | End: 2022-12-07 | Stop reason: HOSPADM

## 2022-12-07 RX ORDER — DEXAMETHASONE SODIUM PHOSPHATE 10 MG/ML
INJECTION, SOLUTION INTRAMUSCULAR; INTRAVENOUS PRN
Status: DISCONTINUED | OUTPATIENT
Start: 2022-12-07 | End: 2022-12-07

## 2022-12-07 RX ORDER — GLYCOPYRROLATE 0.2 MG/ML
INJECTION, SOLUTION INTRAMUSCULAR; INTRAVENOUS PRN
Status: DISCONTINUED | OUTPATIENT
Start: 2022-12-07 | End: 2022-12-07

## 2022-12-07 RX ORDER — MEPERIDINE HYDROCHLORIDE 25 MG/ML
12.5 INJECTION INTRAMUSCULAR; INTRAVENOUS; SUBCUTANEOUS
Status: DISCONTINUED | OUTPATIENT
Start: 2022-12-07 | End: 2022-12-07 | Stop reason: HOSPADM

## 2022-12-07 RX ORDER — PROPOFOL 10 MG/ML
INJECTION, EMULSION INTRAVENOUS PRN
Status: DISCONTINUED | OUTPATIENT
Start: 2022-12-07 | End: 2022-12-07

## 2022-12-07 RX ORDER — LIDOCAINE 40 MG/G
CREAM TOPICAL
Status: DISCONTINUED | OUTPATIENT
Start: 2022-12-07 | End: 2022-12-07 | Stop reason: HOSPADM

## 2022-12-07 RX ORDER — FENTANYL CITRATE 50 UG/ML
25 INJECTION, SOLUTION INTRAMUSCULAR; INTRAVENOUS EVERY 5 MIN PRN
Status: DISCONTINUED | OUTPATIENT
Start: 2022-12-07 | End: 2022-12-07 | Stop reason: HOSPADM

## 2022-12-07 RX ORDER — SODIUM CHLORIDE, SODIUM LACTATE, POTASSIUM CHLORIDE, CALCIUM CHLORIDE 600; 310; 30; 20 MG/100ML; MG/100ML; MG/100ML; MG/100ML
INJECTION, SOLUTION INTRAVENOUS CONTINUOUS
Status: DISCONTINUED | OUTPATIENT
Start: 2022-12-07 | End: 2022-12-07 | Stop reason: HOSPADM

## 2022-12-07 RX ORDER — CEFAZOLIN SODIUM/WATER 2 G/20 ML
2 SYRINGE (ML) INTRAVENOUS
Status: COMPLETED | OUTPATIENT
Start: 2022-12-07 | End: 2022-12-07

## 2022-12-07 RX ORDER — FENTANYL CITRATE 50 UG/ML
50 INJECTION, SOLUTION INTRAMUSCULAR; INTRAVENOUS EVERY 5 MIN PRN
Status: DISCONTINUED | OUTPATIENT
Start: 2022-12-07 | End: 2022-12-07 | Stop reason: HOSPADM

## 2022-12-07 RX ORDER — LIDOCAINE HYDROCHLORIDE 20 MG/ML
INJECTION, SOLUTION INFILTRATION; PERINEURAL PRN
Status: DISCONTINUED | OUTPATIENT
Start: 2022-12-07 | End: 2022-12-07

## 2022-12-07 RX ORDER — ONDANSETRON 4 MG/1
4 TABLET, ORALLY DISINTEGRATING ORAL EVERY 30 MIN PRN
Status: DISCONTINUED | OUTPATIENT
Start: 2022-12-07 | End: 2022-12-07 | Stop reason: HOSPADM

## 2022-12-07 RX ORDER — HYDROMORPHONE HCL IN WATER/PF 6 MG/30 ML
0.4 PATIENT CONTROLLED ANALGESIA SYRINGE INTRAVENOUS EVERY 5 MIN PRN
Status: DISCONTINUED | OUTPATIENT
Start: 2022-12-07 | End: 2022-12-07 | Stop reason: HOSPADM

## 2022-12-07 RX ORDER — ACETAMINOPHEN 325 MG/1
975 TABLET ORAL ONCE
Status: COMPLETED | OUTPATIENT
Start: 2022-12-07 | End: 2022-12-07

## 2022-12-07 RX ORDER — FENTANYL CITRATE 50 UG/ML
INJECTION, SOLUTION INTRAMUSCULAR; INTRAVENOUS PRN
Status: DISCONTINUED | OUTPATIENT
Start: 2022-12-07 | End: 2022-12-07

## 2022-12-07 RX ORDER — HYDROMORPHONE HCL IN WATER/PF 6 MG/30 ML
0.2 PATIENT CONTROLLED ANALGESIA SYRINGE INTRAVENOUS EVERY 5 MIN PRN
Status: DISCONTINUED | OUTPATIENT
Start: 2022-12-07 | End: 2022-12-07 | Stop reason: HOSPADM

## 2022-12-07 RX ORDER — CEFAZOLIN SODIUM/WATER 2 G/20 ML
2 SYRINGE (ML) INTRAVENOUS SEE ADMIN INSTRUCTIONS
Status: DISCONTINUED | OUTPATIENT
Start: 2022-12-07 | End: 2022-12-07 | Stop reason: HOSPADM

## 2022-12-07 RX ORDER — ONDANSETRON 2 MG/ML
4 INJECTION INTRAMUSCULAR; INTRAVENOUS EVERY 30 MIN PRN
Status: DISCONTINUED | OUTPATIENT
Start: 2022-12-07 | End: 2022-12-07 | Stop reason: HOSPADM

## 2022-12-07 RX ORDER — OXYCODONE HYDROCHLORIDE 5 MG/1
5-10 TABLET ORAL EVERY 4 HOURS PRN
Qty: 10 TABLET | Refills: 0 | Status: SHIPPED | OUTPATIENT
Start: 2022-12-07 | End: 2022-12-19

## 2022-12-07 RX ORDER — ONDANSETRON 2 MG/ML
INJECTION INTRAMUSCULAR; INTRAVENOUS PRN
Status: DISCONTINUED | OUTPATIENT
Start: 2022-12-07 | End: 2022-12-07

## 2022-12-07 RX ADMIN — NEOSTIGMINE METHYLSULFATE 5 MG: 1 INJECTION, SOLUTION INTRAVENOUS at 09:51

## 2022-12-07 RX ADMIN — SODIUM CHLORIDE, POTASSIUM CHLORIDE, SODIUM LACTATE AND CALCIUM CHLORIDE: 600; 310; 30; 20 INJECTION, SOLUTION INTRAVENOUS at 08:38

## 2022-12-07 RX ADMIN — ROCURONIUM BROMIDE 50 MG: 10 INJECTION, SOLUTION INTRAVENOUS at 08:03

## 2022-12-07 RX ADMIN — GLYCOPYRROLATE 1 MG: 0.2 INJECTION, SOLUTION INTRAMUSCULAR; INTRAVENOUS at 09:51

## 2022-12-07 RX ADMIN — FENTANYL CITRATE 50 MCG: 50 INJECTION, SOLUTION INTRAMUSCULAR; INTRAVENOUS at 08:03

## 2022-12-07 RX ADMIN — ONDANSETRON 4 MG: 2 INJECTION INTRAMUSCULAR; INTRAVENOUS at 08:03

## 2022-12-07 RX ADMIN — ACETAMINOPHEN 975 MG: 325 TABLET ORAL at 06:24

## 2022-12-07 RX ADMIN — Medication 2 G: at 07:51

## 2022-12-07 RX ADMIN — HYDROMORPHONE HYDROCHLORIDE 0.5 MG: 1 INJECTION, SOLUTION INTRAMUSCULAR; INTRAVENOUS; SUBCUTANEOUS at 08:31

## 2022-12-07 RX ADMIN — MIDAZOLAM 2 MG: 1 INJECTION INTRAMUSCULAR; INTRAVENOUS at 07:51

## 2022-12-07 RX ADMIN — HYDROMORPHONE HYDROCHLORIDE 0.5 MG: 1 INJECTION, SOLUTION INTRAMUSCULAR; INTRAVENOUS; SUBCUTANEOUS at 08:25

## 2022-12-07 RX ADMIN — DEXAMETHASONE SODIUM PHOSPHATE 10 MG: 10 INJECTION, SOLUTION INTRAMUSCULAR; INTRAVENOUS at 08:03

## 2022-12-07 RX ADMIN — ONDANSETRON 4 MG: 2 INJECTION INTRAMUSCULAR; INTRAVENOUS at 11:57

## 2022-12-07 RX ADMIN — FENTANYL CITRATE 50 MCG: 50 INJECTION, SOLUTION INTRAMUSCULAR; INTRAVENOUS at 07:55

## 2022-12-07 RX ADMIN — PROPOFOL 200 MG: 10 INJECTION, EMULSION INTRAVENOUS at 08:03

## 2022-12-07 RX ADMIN — ROCURONIUM BROMIDE 20 MG: 10 INJECTION, SOLUTION INTRAVENOUS at 08:56

## 2022-12-07 RX ADMIN — ROCURONIUM BROMIDE 20 MG: 10 INJECTION, SOLUTION INTRAVENOUS at 08:22

## 2022-12-07 RX ADMIN — SODIUM CHLORIDE, POTASSIUM CHLORIDE, SODIUM LACTATE AND CALCIUM CHLORIDE 1000 ML: 600; 310; 30; 20 INJECTION, SOLUTION INTRAVENOUS at 07:10

## 2022-12-07 RX ADMIN — LIDOCAINE HYDROCHLORIDE 60 MG: 20 INJECTION, SOLUTION INFILTRATION; PERINEURAL at 08:03

## 2022-12-07 ASSESSMENT — ACTIVITIES OF DAILY LIVING (ADL)
ADLS_ACUITY_SCORE: 35

## 2022-12-07 NOTE — LETTER
[facility] OR  [address] [address]  [phone]          December 7, 2022    RE:  Alfredo Velasquez                                                                                                                                                       [facility] [place] [address]            To whom it may concern:    Alfredo Velasquez underwent surgery on 12/7/2022.  He will require 1 week off from work to allow himself to recuperate.  Upon return, he has a lifting restrictions of approximately 30 pounds for 6 weeks after surgery.  Please make appropriate accommodations.  Thank you for your attention is important matter.      Sincerely,        Guero Mcgarry MD  General Surgeon  [facility]  Surgery Clinic - [facility]  [address] [place] [address]?  Office: [phone]

## 2022-12-07 NOTE — OP NOTE
General Surgery Operative Note    Date of Surgery: 12/7/2022     Surgeon: Guero Mcgaryr MD    Preoperative Diagnosis: Left inguinal hernia    Postoperative Diagnosis: Indirect inguinal hernia    Procedure: Robotic left inguinal hernia repair    EBL: 5 cc    Findings: Small indirect defect    Indications:  Patient is a 47-year-old man who presented with left inguinal pain was found to have a hernia on imaging.  After discussion of risk and benefits surgery decision was made to proceed with surgery.  Patient consented to the operation.    Details of operation:  Was brought to the operating room placed on the table in the supine position.  General anesthesia was induced without incident.  The patient was prepped and draped in usual sterile fashion.  A timeout for safety was performed.    Began with left upper quadrant Veress needle entry and pneumoperitoneum was established without incident.  An 8 mm optical robotic trocar was then placed in the supraumbilical area.  2 additional 8 mm ports were placed.  There is no injury with entry.  There was a small left-sided hernia visible.  Patient was placed in Trendelenburg position and a 10 x 15 cm ProGrip mesh and 2 OV lock suture were introduced into the abdomen.  The robot was then docked.    Patient had some adherence sigmoid colon epiploic appendages in the left lower quadrant.  These were taken down with electrocautery.  I then made a peritoneal flap on the left side.  And this was carried down to the iliopubic tract.  Laterally I was able to clear this off quite easily.  Medially was quite difficult as the patient had a large amount of fat medially around the bladder.  The planes were quite difficult to find due to this large amount of fatty tissue.  Ultimately I was able to identify the Jett's ligament and the pubic symphysis.  I cleared 2 cm below this.  I do not see any direct defect.  I reduced a small amount of cord lipoma from the indirect hernia.  There  was a small amount of oozing.  A Ray-Melina was introduced to assure hemostasis and clean up the small amount of blood products.  At this point I felt that the dissection was adequate.  The ProGrip mesh was then laid into place with excellent coverage over all of the spaces.  It laid really quite nicely.  The large medial fat pad was assured to be on the peritoneal side of the mesh.  I then closed the peritoneal flap using a 2 oh V-Loc suture.  There were 2 small rents in the peritoneal flap that I closed with 2-0 Vicryl sutures.  The robot was then undocked and the abdomen desufflated.  The ports removed and the skin was closed with 4-0 Monocryl sutures.  Patient was awakened from anesthesia and brought to the recovery room.    Guero Mcgarry MD  General Surgeon  [facility]  Surgery [address] [address]?  Office: [phone]

## 2022-12-07 NOTE — ANESTHESIA CARE TRANSFER NOTE
Patient: Alfredo Velasquez    Procedure: Procedure(s):  HERNIORRHAPHY, INGUINAL, ROBOT-ASSISTED, LAPAROSCOPIC, USING DA PALOMA XI       Diagnosis: Non-recurrent unilateral inguinal hernia without obstruction or gangrene [K40.90]  Diagnosis Additional Information: No value filed.    Anesthesia Type:   General     Note:    Oropharynx: oropharynx clear of all foreign objects and spontaneously breathing  Level of Consciousness: drowsy  Oxygen Supplementation: face mask  Level of Supplemental Oxygen (L/min / FiO2): 8  Independent Airway: airway patency satisfactory and stable  Dentition: dentition unchanged  Vital Signs Stable: post-procedure vital signs reviewed and stable  Report to RN Given: handoff report given  Patient transferred to: PACU    Handoff Report: Identifed the Patient, Identified the Reponsible Provider, Reviewed the pertinent medical history, Discussed the surgical course, Reviewed Intra-OP anesthesia mangement and issues during anesthesia, Set expectations for post-procedure period and Allowed opportunity for questions and acknowledgement of understanding      Vitals:  Vitals Value Taken Time   /81 12/07/22 1010   Temp 36.6  C (97.8  F) 12/07/22 1008   Pulse 75 12/07/22 1012   Resp 10 12/07/22 1012   SpO2 99 % 12/07/22 1012   Vitals shown include unvalidated device data.    Electronically Signed By: POP Gutierrez CRNA  December 7, 2022  10:14 AM

## 2022-12-07 NOTE — ANESTHESIA PREPROCEDURE EVALUATION
Anesthesia Pre-Procedure Evaluation    Patient: Alferdo Velasquez   MRN: [id number] : 1975        Procedure : Procedure(s):  HERNIORRHAPHY, INGUINAL, ROBOT-ASSISTED, LAPAROSCOPIC, USING DA PALOMA XI          Past Medical History:   Diagnosis Date     Acute Sore Throat     Created by Conversion      Essential Hypercholesterolemia     Created by Conversion      Foot Pain (Soft Tissue)     Created by Conversion [facility] Annotation: 2013 12:03PM - [place]: for years      High blood pressure 2017     Obesity (BMI 35.0-39.9) with comorbidity (H) 2021     Pes Planus     Created by Conversion      Renal disease      Sleep apnea       Past Surgical History:   Procedure Laterality Date     AORTA SURGERY      Age: 5     TESTICLE SURGERY      Age: 12      No Known Allergies   Social History     Tobacco Use     Smoking status: Never     Smokeless tobacco: Never   Substance Use Topics     Alcohol use: Yes     Comment: social      Wt Readings from Last 1 Encounters:   22 119.3 kg (263 lb)        Anesthesia Evaluation   Pt has had prior anesthetic. Type: General.    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      METS/Exercise Tolerance:     Hematologic:  - neg hematologic  ROS     Musculoskeletal:  - neg musculoskeletal ROS     GI/Hepatic:  - neg GI/hepatic ROS     Renal/Genitourinary:       Endo:  - neg endo ROS   (+) Obesity,     Psychiatric/Substance Use:  - neg psychiatric ROS     Infectious Disease:  - neg infectious disease ROS     Malignancy:  - neg malignancy ROS     Other:            Physical Exam    Airway        Mallampati: II   TM distance: > 3 FB   Neck ROM: full     Respiratory Devices and Support         Dental  no notable dental history         Cardiovascular          Rhythm and  rate: regular and normal     Pulmonary   pulmonary exam normal                OUTSIDE LABS:  CBC:   Lab Results   Component Value Date    WBC 8.5 10/24/2022    WBC 7.7 12/16/2020    HGB 15.4 10/24/2022    HGB 15.5 12/16/2020    HCT 44.8 10/24/2022    HCT 46.7 12/16/2020     10/24/2022     12/16/2020     BMP:   Lab Results   Component Value Date     10/24/2022     05/18/2022    POTASSIUM 3.6 10/24/2022    POTASSIUM 4.0 05/18/2022    CHLORIDE 106 10/24/2022    CHLORIDE 105 05/18/2022    CO2 25 10/24/2022    CO2 28 05/18/2022    BUN 21.7 (H) 10/24/2022    BUN 15 05/18/2022    CR 1.12 10/24/2022    CR 0.97 05/18/2022     (H) 10/24/2022    GLC 93 05/18/2022     COAGS: No results found for: PTT, INR, FIBR  POC: No results found for: BGM, HCG, HCGS  HEPATIC:   Lab Results   Component Value Date    ALBUMIN 4.1 05/18/2022    PROTTOTAL 6.9 05/18/2022    ALT 27 05/18/2022    AST 23 05/18/2022    ALKPHOS 108 05/18/2022    BILITOTAL 0.5 05/18/2022     OTHER:   Lab Results   Component Value Date    A1C 5.8 (H) 05/18/2022    NACHO 8.9 10/24/2022    MAG 2.1 12/16/2020       Anesthesia Plan    ASA Status:  3      Anesthesia Type: General.     - Airway: ETT              Consents    Anesthesia Plan(s) and associated risks, benefits, and realistic alternatives discussed. Questions answered and patient/representative(s) expressed understanding.    - Discussed:     - Discussed with:  Patient      - Extended Intubation/Ventilatory Support Discussed: No.      - Patient is DNR/DNI Status: No    Use of blood products discussed: No .     Postoperative Care    Pain management: IV analgesics.   PONV prophylaxis: Ondansetron (or other 5HT-3), Dexamethasone or Solumedrol     Comments:                Jose Pearce MD

## 2022-12-07 NOTE — INTERVAL H&P NOTE
"I have reviewed the surgical (or preoperative) H&P that is linked to this encounter, and examined the patient. There are no significant changes.     Clinical Conditions Present on Arrival:  Clinically Significant Risk Factors Present on Admission                    # Obesity: Estimated body mass index is 38.82 kg/m  as calculated from the following:    Height as of this encounter: 1.753 m (5' 9.02\").    Weight as of this encounter: 119.3 kg (263 lb).       "

## 2022-12-07 NOTE — PHARMACY-ADMISSION MEDICATION HISTORY
Pharmacy Note - Admission Medication History    Pertinent Provider Information:    ______________________________________________________________________    Prior To Admission (PTA) med list completed and updated in EMR.       PTA Med List   Medication Sig Note Last Dose     aspirin 81 MG EC tablet Take 81 mg by mouth daily 12/6/2022: Continue per surgeon 12/6/2022     fexofenadine (ALLEGRA) 180 MG tablet [FEXOFENADINE (ALLEGRA) 180 MG TABLET] Take 180 mg by mouth daily.  Past Month     lisinopril (ZESTRIL) 20 MG tablet Take 1 tablet (20 mg) by mouth daily  12/6/2022       Information source(s): Patient and CareEverywhere/SureScripts    Method of interview communication: in-person    Patient was asked about OTC/herbal products specifically.  PTA med list reflects this.    Based on the pharmacist's assessment, the PTA med list information appears reliable    Allergies were reviewed, assessed, and updated with the patient.      Patient did not bring any medications to the hospital and can't retrieve from home. No multi-dose medications are available for use during hospital stay.      Thank you for the opportunity to participate in the care of this patient.      Nilesh Sagastume RPH     12/7/2022     6:52 AM

## 2022-12-07 NOTE — ANESTHESIA PROCEDURE NOTES
Airway       Patient location during procedure: OR       Procedure Start/Stop Times: 12/7/2022 8:07 AM  Staff -        CRNA: Krystal Trimble APRN CRNA       Performed By: CRNA  Consent for Airway        Urgency: elective  Indications and Patient Condition       Indications for airway management: livier-procedural       Induction type:intravenous       Mask difficulty assessment: 2 - vent by mask + OA or adjuvant +/- NMBA    Final Airway Details       Final airway type: endotracheal airway       Successful airway: ETT - single  Endotracheal Airway Details        ETT size (mm): 8.0       Cuffed: yes       Successful intubation technique: direct laryngoscopy       DL Blade Type: MAC 4       Grade View of Cords: 3       Adjucts: stylet       Position: Right       Measured from: gums/teeth       Secured at (cm): 23       Bite block used: Oral Airway    Post intubation assessment        Placement verified by: capnometry, equal breath sounds and chest rise        Number of attempts at approach: 1       Secured with: silk tape       Ease of procedure: easy       Dentition: Intact       Dental guard used and removed. Dental Guard Type: Proguard Red.    Medication(s) Administered   Medication Administration Time: 12/7/2022 8:07 AM

## 2022-12-07 NOTE — ANESTHESIA POSTPROCEDURE EVALUATION
Patient: Alfredo Velasquez    Procedure: Procedure(s):  HERNIORRHAPHY, INGUINAL, ROBOT-ASSISTED, LAPAROSCOPIC, USING DA PALOMA XI       Anesthesia Type:  General    Note:     Postop Pain Control: Uneventful            Sign Out: Well controlled pain   PONV: No   Neuro/Psych: Uneventful            Sign Out: Acceptable/Baseline neuro status   Airway/Respiratory: Uneventful            Sign Out: Acceptable/Baseline resp. status   CV/Hemodynamics: Uneventful            Sign Out: Acceptable CV status; No obvious hypovolemia; No obvious fluid overload   Other NRE: NONE   DID A NON-ROUTINE EVENT OCCUR? No           Last vitals:  Vitals Value Taken Time   /73 12/07/22 1050   Temp 36.6  C (97.8  F) 12/07/22 1008   Pulse 61 12/07/22 1053   Resp 10 12/07/22 1053   SpO2 94 % 12/07/22 1053   Vitals shown include unvalidated device data.    Electronically Signed By: Jose Pearce MD  December 7, 2022  11:42 AM

## 2022-12-12 PROBLEM — N50.812 PAIN IN LEFT TESTICLE: Status: ACTIVE | Noted: 2022-11-16

## 2022-12-12 PROBLEM — R31.29 MICROSCOPIC HEMATURIA: Status: ACTIVE | Noted: 2022-11-16

## 2022-12-19 ENCOUNTER — OFFICE VISIT (OUTPATIENT)
Dept: SURGERY | Facility: CLINIC | Age: 47
End: 2022-12-19
Payer: COMMERCIAL

## 2022-12-19 DIAGNOSIS — Z98.890 S/P LEFT INGUINAL HERNIA REPAIR: Primary | ICD-10-CM

## 2022-12-19 DIAGNOSIS — Z87.19 S/P LEFT INGUINAL HERNIA REPAIR: Primary | ICD-10-CM

## 2022-12-19 PROCEDURE — 99024 POSTOP FOLLOW-UP VISIT: CPT | Performed by: SURGERY

## 2022-12-19 NOTE — PROGRESS NOTES
General surgery clinic follow-up    Patient is a 47-year-old man who underwent a robotic left inguinal hernia repair on 12/7/2022.  His postoperative course has been fairly unremarkable.  He had minimal pain.  He has been moving well.  He had no bruising and minimal swelling.  He did have a little bit of pain the other night at his supraumbilical incision and he has a little bruising on his left side incision now.  This is new.  The pain that he had prior to surgery has mostly resolved.  He has a small amount of numbness in his testicle occasionally.  His main question is regarding sexual activity.    Sitting up in chair in no acute distress  Abdomen is soft and nontender nondistended.  Incisions are well-healed.  Small amount of bruising on the left sided incision.  Left groin feels intact with no recurrence of hernia.    Patient is recovered well after his robotic left inguinal hernia repair.  I have no further recommendations at this time.  He will continue lifting restrictions to about 4 weeks after surgery for work purposes.    Return to clinic only if needed.    Guero Mcgarry MD  General Surgeon  [facility]  Surgery Clinic [address] [address]?  Office: [phone]

## 2022-12-19 NOTE — LETTER
12/19/2022         RE: Alfredo Velasquez  [address]  [place] [address]        Dear Colleague,    Thank you for referring your patient, Alfredo Velasquez, to the [facility] AND [facility] [place]. Please see a copy of my visit note below.    General surgery clinic follow-up    Patient is a 47-year-old man who underwent a robotic left inguinal hernia repair on 12/7/2022.  His postoperative course has been fairly unremarkable.  He had minimal pain.  He has been moving well.  He had no bruising and minimal swelling.  He did have a little bit of pain the other night at his supraumbilical incision and he has a little bruising on his left side incision now.  This is new.  The pain that he had prior to surgery has mostly resolved.  He has a small amount of numbness in his testicle occasionally.  His main question is regarding sexual activity.    Sitting up in chair in no acute distress  Abdomen is soft and nontender nondistended.  Incisions are well-healed.  Small amount of bruising on the left sided incision.  Left groin feels intact with no recurrence of hernia.    Patient is recovered well after his robotic left inguinal hernia repair.  I have no further recommendations at this time.  He will continue lifting restrictions to about 4 weeks after surgery for work purposes.    Return to clinic only if needed.    Guero Mcgarry MD  General Surgeon  [facility]  Surgery Clinic - [address]  [place] [address]?  Office: [phone]        Again, thank you for allowing me to participate in the care of your patient.        Sincerely,        Guero Mcgarry MD

## 2023-02-28 ENCOUNTER — OFFICE VISIT (OUTPATIENT)
Dept: SLEEP MEDICINE | Facility: CLINIC | Age: 48
End: 2023-02-28
Attending: FAMILY MEDICINE
Payer: COMMERCIAL

## 2023-02-28 ENCOUNTER — MYC MEDICAL ADVICE (OUTPATIENT)
Dept: SLEEP MEDICINE | Facility: CLINIC | Age: 48
End: 2023-02-28

## 2023-02-28 VITALS
HEIGHT: 69 IN | WEIGHT: 273 LBS | DIASTOLIC BLOOD PRESSURE: 86 MMHG | SYSTOLIC BLOOD PRESSURE: 140 MMHG | HEART RATE: 58 BPM | BODY MASS INDEX: 40.43 KG/M2 | OXYGEN SATURATION: 97 %

## 2023-02-28 DIAGNOSIS — G47.33 OBSTRUCTIVE SLEEP APNEA: Primary | ICD-10-CM

## 2023-02-28 DIAGNOSIS — G47.33 OSA (OBSTRUCTIVE SLEEP APNEA): Primary | ICD-10-CM

## 2023-02-28 PROCEDURE — 99203 OFFICE O/P NEW LOW 30 MIN: CPT | Performed by: INTERNAL MEDICINE

## 2023-02-28 ASSESSMENT — SLEEP AND FATIGUE QUESTIONNAIRES
HOW LIKELY ARE YOU TO NOD OFF OR FALL ASLEEP WHILE SITTING AND READING: WOULD NEVER DOZE
HOW LIKELY ARE YOU TO NOD OFF OR FALL ASLEEP WHILE SITTING AND TALKING TO SOMEONE: WOULD NEVER DOZE
HOW LIKELY ARE YOU TO NOD OFF OR FALL ASLEEP WHILE SITTING QUIETLY AFTER LUNCH WITHOUT ALCOHOL: WOULD NEVER DOZE
HOW LIKELY ARE YOU TO NOD OFF OR FALL ASLEEP WHILE SITTING INACTIVE IN A PUBLIC PLACE: WOULD NEVER DOZE
HOW LIKELY ARE YOU TO NOD OFF OR FALL ASLEEP WHILE LYING DOWN TO REST IN THE AFTERNOON WHEN CIRCUMSTANCES PERMIT: MODERATE CHANCE OF DOZING
HOW LIKELY ARE YOU TO NOD OFF OR FALL ASLEEP IN A CAR, WHILE STOPPED FOR A FEW MINUTES IN TRAFFIC: WOULD NEVER DOZE
HOW LIKELY ARE YOU TO NOD OFF OR FALL ASLEEP WHILE WATCHING TV: SLIGHT CHANCE OF DOZING
HOW LIKELY ARE YOU TO NOD OFF OR FALL ASLEEP WHEN YOU ARE A PASSENGER IN A CAR FOR AN HOUR WITHOUT A BREAK: WOULD NEVER DOZE

## 2023-02-28 NOTE — NURSING NOTE
"Chief Complaint   Patient presents with     Consult       Initial BP (!) 140/86   Pulse 58   Ht 1.756 m (5' 9.13\")   Wt 123.8 kg (273 lb)   SpO2 97%   BMI 40.16 kg/m   Estimated body mass index is 40.16 kg/m  as calculated from the following:    Height as of this encounter: 1.756 m (5' 9.13\").    Weight as of this encounter: 123.8 kg (273 lb).    Medication Reconciliation: complete    Neck circumference: 46 centimeters.    DME: currently not using one and would like to use [facility]      AMANDA Goodrich  [facility] [place]      "

## 2023-02-28 NOTE — PATIENT INSTRUCTIONS
Equipment Instructions    We will process your PAP order and send it to a Durable Medical Equipment (DME) provider.    The medical equipment company should call you within 7 days.  If you have not heard from the company, please contact them to see if they received your order and are planning to call you.    Please call us at [phone] if you are unable to contact the medical equipment company or if they do not have the order.    If you are starting a new PAP machine, please call us after you use it the first night to let us know how it went. This call also helps us know that you received your equipment and that everything is ready. Please use our central phone number [phone]    Contact information for [employer] company:    [employer] [facility] Tel: [phone]

## 2023-02-28 NOTE — TELEPHONE ENCOUNTER
Please call to inform the patient that we were able to obtain his sleep study that was performed on 12/26/12 (AHI=17.3). I will therefore write him a prescription to get a new CPAP machine ASAP from [employer] [facility]. Please inform him of this change.

## 2023-02-28 NOTE — NURSING NOTE
Return in about 2 year reminder created and to be send via [employer].       Destiney Barreto DILIA  [facility]

## 2023-02-28 NOTE — PROGRESS NOTES
Additional 15 minutes on the date of service was spent performing the following:    -Preparing to see the patient  -Obtaining and/or reviewing separately obtained history   -Ordering medications, tests, or procedures   -Documenting clinical information in the electronic or other health record     Assessment and Plan:    1. KRAIG (obstructive sleep apnea)  I congratulated the patient on his excellent CPAP usage.  I will write a prescription for the patient to receive a new CPAP machine to see if he qualifies.  - Adult Sleep Eval & Management  Referral  - COMPREHENSIVE DME    History of present illness:    He is a 47 year old male who comes to the clinic transfer of care of his KRAIG. He was diagnosed with KRAIG on 12/26/2012 (AHI=17.3).  The patient's current CPAP machine is shutting off spontaneously.  While he is still getting benefit from therapy.  He feels that it is getting so old that it is not functioning reliably.  He would like to establish care with us so that he can get a new CPAP machine from [employer].  Patient otherwise has no complaints.     Ideal Sleep-Wake Cycle(devoid of societal pressure):    TIME IN BED:    1) Work/School Days:    Do you work or go to school? Yes   What time do you usually get into bed? work   About how long does it take you to fall asleep? 1to 2min   How often do you have trouble falling asleep? 0   How often do you wake up during the night? 1   Do you work days/evenings/nights/rotating shifts? Days   What wakes you up at night? Use the bathroom   How often do you have trouble falling back to sleep? 0   About how long does it take to fall back to sleep? 0   What do you usually do if you have trouble getting back to sleep? no trouble   What time do you usually get out of bed to start your day? 2:45am   Do you use an alarm? Yes   2) Weekends/Non-work Days/All Other Days    What time do you usually get into bed? 9   About how long does it  take you to fall asleep? 5 minutes   What time do you usually get out of bed to start your day? 6   Do you use an alarm? No   SLEEP NEED    On average, about how much sleep do you think you get? 5 to 6 hours   About how much sleep do you think you need? 8 hours   SLEEP POSITION    Which sleep positions do you prefer? Back    Side   Do you do any of the following activities in bed? Watch TV    Use phone, computer, or tablet   How often do you take a nap on purpose? never   About how long are your naps? none   Do you feel better after naps?    How often do you doze off unintentionally?  2times   Have you ever had a driving accident or near-miss due to sleepiness/drowsiness? No     Compliance Download data for 30 Days:  Pressure setting:CPAP 7.6 cwp  Residual AHI:0.5 events per hour  Leak:Minimal  Compliance:90%  Mask Tolerance:Good  Skin irritation:None  DME:[facility]    SI:  HEENA Total Score: 2  Total score - [place]: 3 (2/28/2023 11:16 AM)      Patient Active Problem List   Diagnosis     Essential Hypercholesterolemia     Foot Pain (Soft Tissue)     Pes Planus     Acute Sore Throat     High blood pressure     Obesity (BMI 35.0-39.9) with comorbidity (H)     Microscopic hematuria     Pain in left testicle       Past Medical History  Past Medical History:   Diagnosis Date     Acute Sore Throat     Created by Conversion      Essential Hypercholesterolemia     Created by Conversion      Foot Pain (Soft Tissue)     Created by Conversion [facility] Annotation: Jul 18 2013 12:03PM - [place]: for years      High blood pressure 12/07/2017     Obesity (BMI 35.0-39.9) with comorbidity (H) 02/16/2021     Pes Planus     Created by Conversion      Renal disease      Sleep apnea         Past Surgical History  Past Surgical History:   Procedure Laterality Date     AORTA SURGERY      Age: 5     DAVINCI XI HERNIORRHAPHY INGUINAL Left 12/7/2022    Procedure: HERNIORRHAPHY, INGUINAL, ROBOT-ASSISTED, LAPAROSCOPIC, USING DA  PALOMA TORRES;  Surgeon: Guero Mcgarry MD;  Location: [facility] Main OR     TESTICLE SURGERY      Age: 12        Meds  Current Outpatient Medications   Medication Sig Dispense Refill     aspirin 81 MG EC tablet Take 81 mg by mouth daily       fexofenadine (ALLEGRA) 180 MG tablet [FEXOFENADINE (ALLEGRA) 180 MG TABLET] Take 180 mg by mouth daily.       lisinopril (ZESTRIL) 20 MG tablet Take 1 tablet (20 mg) by mouth daily 90 tablet 2        Allergies  Patient has no known allergies.     Social History  Social History     Socioeconomic History     Marital status:      Spouse name: Not on file     Number of children: Not on file     Years of education: Not on file     Highest education level: Not on file   Occupational History     Not on file   Tobacco Use     Smoking status: Never     Smokeless tobacco: Never   Substance and Sexual Activity     Alcohol use: Yes     Comment: social     Drug use: Never     Sexual activity: Not on file   Other Topics Concern     Not on file   Social History Narrative     Not on file     Social Determinants of Health     Financial Resource Strain: Not on file   Food Insecurity: Not on file   Transportation Needs: Not on file   Physical Activity: Not on file   Stress: Not on file   Social Connections: Not on file   Intimate Partner Violence: Not on file   Housing Stability: Not on file        Family History  Family History   Problem Relation Age of Onset     Hypertension Mother      Sleep Apnea Mother      Hypertension Father      Myocardial Infarction Father        Review of Systems:  Constitutional: Negative except as noted in HPI.   Eyes: Negative except as noted in HPI.   ENT: Negative except as noted in HPI.   Cardiovascular: Negative except as noted in HPI.   Respiratory: Negative except as noted in HPI.   Gastrointestinal: Negative except as noted in HPI.   Genitourinary: Negative except as noted in HPI.   Musculoskeletal: Negative except as noted in HPI.  "  Integumentary: Negative except as noted in HPI.   Neurological: Negative except as noted in HPI.   Psychiatric: Negative except as noted in HPI.   Endocrine: Negative except as noted in HPI.   Hematologic/Lymphatic: Negative except as noted in HPI.      Physical Exam:  BP (!) 140/86   Pulse 58   Ht 1.756 m (5' 9.13\")   Wt 123.8 kg (273 lb)   SpO2 97%   BMI 40.16 kg/m    BMI:Body mass index is 40.16 kg/m .   GEN: NAD, morbidly obese  Head: Normocephalic.  EYES: PERRLA, EOMI  ENT: Oropharynx is clear, Frye class 4+ airway.   Nasal mucosa is moist without erythema  Neck : Thyroid is within normal limits.   CV: Regular rate and rhythm, S1 & S2 positive.  LUNGS: Bilateral breathsounds heard.   ABDOMEN: Positive bowel sounds in all quadrants, soft, no rebound or guarding  MUSCULOSKELETAL: Bilateral trace leg swelling  SKIN: warm, dry, no rashes  Neurological: Alert, oriented to time, place, and person. Gait is normal. Strength 5/5 in all extremities.  Psych: normal mood, normal affect     Labs/Studies:     No results found for: PH, PHARTERIAL, PO2, [id number], SAT, PCO2, HCO3, BASEEXCESS, RAMSEY, BEB  No results found for: TSH  Lab Results   Component Value Date     (H) 10/24/2022    GLC 93 05/18/2022     Lab Results   Component Value Date    HGB 15.4 10/24/2022    HGB 15.5 12/16/2020     Lab Results   Component Value Date    BUN 21.7 (H) 10/24/2022    BUN 15 05/18/2022    CR 1.12 10/24/2022    CR 0.97 05/18/2022     Lab Results   Component Value Date    AST 23 05/18/2022    ALT 27 05/18/2022    ALKPHOS 108 05/18/2022    BILITOTAL 0.5 05/18/2022     No results found for: UAMP, UBARB, BENZODIAZEUR, UCANN, UCOC, OPIT, UPCP      Patient verbalized understanding of these issues, agrees with the plan and all questions were answered today. Patient was given an opportuntity to voice any other symptoms or concerns not listed above. Patient did not have any other symptoms or concerns.         Merlin Dacosta, , "   Board Certified in Internal Medicine and Sleep Medicine    (Note created with Dragon voice recognition and unintended spelling errors and word substitutions may occur)

## 2023-03-01 ENCOUNTER — MYC MEDICAL ADVICE (OUTPATIENT)
Dept: SLEEP MEDICINE | Facility: CLINIC | Age: 48
End: 2023-03-01
Payer: COMMERCIAL

## 2023-03-08 ENCOUNTER — TELEPHONE (OUTPATIENT)
Dept: SLEEP MEDICINE | Facility: CLINIC | Age: 48
End: 2023-03-08
Payer: COMMERCIAL

## 2023-03-08 NOTE — TELEPHONE ENCOUNTER
General Call    Contacts       Type Contact Phone/Fax    03/08/2023 03:30 PM CST Phone (Incoming) ANGELICA WOODWARD (Emergency Contact) [phone]        Reason for Call:  Changing Home Medical    What are your questions or concerns:  Pt would like CPAP orders sent to [facility]. Other facility is out of insurance network. [facility] asks that we make sure sleep study and visit notes are included with the order. Fax # [phone]    Date of last appointment with provider: 2/28/23    Could we send this information to you in [employer] or would you prefer to receive a phone call?:   Patient would prefer a phone call   Okay to leave a detailed message?: Yes at Cell number on file:    Telephone Information:   Mobile [phone]

## 2023-03-10 ENCOUNTER — MYC MEDICAL ADVICE (OUTPATIENT)
Dept: SLEEP MEDICINE | Facility: CLINIC | Age: 48
End: 2023-03-10
Payer: COMMERCIAL

## 2023-03-14 NOTE — TELEPHONE ENCOUNTER
Called and spoke with patient. Informed him that we faxed his current face-to-face visit for sleep medicine, a copy of his sleep study, a copy of current DME order, and a face sheet to [facility] at [phone]. He said thanks.  Vanessa Ramos, CMA

## 2023-03-14 NOTE — TELEPHONE ENCOUNTER
Called and spoke with patient. Informed him that we faxed his current face-to-face visit for sleep medicine, a copy of his sleep study, a copy of current DME order, and a face sheet to [facility] at [phone]. He said thanks.  Vanessa Ramos, CMA

## 2023-03-23 DIAGNOSIS — I15.9 SECONDARY HYPERTENSION: ICD-10-CM

## 2023-03-23 NOTE — TELEPHONE ENCOUNTER
"Refill Request  Medication name: Pending Prescriptions:                       Disp   Refills    lisinopril (ZESTRIL) 20 MG tablet         90 tab*2            Sig: Take 1 tablet (20 mg) by mouth daily    Requested Pharmacy: [facility] refill request to provider for review/approval because:  Blood pressure outside of protocol parameters    Last Written Prescription Date:  7/8/2022  Last Fill Quantity: 90,  # refills: 2   Last office visit provider:  10/24/2022     Requested Prescriptions   Pending Prescriptions Disp Refills     lisinopril (ZESTRIL) 20 MG tablet 90 tablet 2     Sig: Take 1 tablet (20 mg) by mouth daily       ACE Inhibitors (Including Combos) Protocol Failed - 3/23/2023  1:09 PM        Failed - Blood pressure under 140/90 in past 12 months     BP Readings from Last 3 Encounters:   02/28/23 (!) 140/86   12/07/22 139/65   11/09/22 (!) 162/98                 Passed - Recent (12 mo) or future (30 days) visit within the authorizing provider's specialty     Patient has had an office visit with the authorizing provider or a provider within the authorizing providers department within the previous 12 mos or has a future within next 30 days. See \"Patient Info\" tab in inbasket, or \"Choose Columns\" in Meds & Orders section of the refill encounter.              Passed - Medication is active on med list        Passed - Patient is age 18 or older        Passed - Normal serum creatinine on file in past 12 months     Recent Labs   Lab Test 10/24/22  1508   CR 1.12       Ok to refill medication if creatinine is low          Passed - Normal serum potassium on file in past 12 months     Recent Labs   Lab Test 10/24/22  1508   POTASSIUM 3.6                  Dolores Petty RN 03/24/23 12:19 PM  "

## 2023-03-24 RX ORDER — LISINOPRIL 20 MG/1
20 TABLET ORAL DAILY
Qty: 90 TABLET | Refills: 0 | Status: SHIPPED | OUTPATIENT
Start: 2023-03-24 | End: 2023-05-30

## 2023-04-26 ENCOUNTER — OFFICE VISIT (OUTPATIENT)
Dept: FAMILY MEDICINE | Facility: CLINIC | Age: 48
End: 2023-04-26
Payer: COMMERCIAL

## 2023-04-26 VITALS
SYSTOLIC BLOOD PRESSURE: 172 MMHG | OXYGEN SATURATION: 96 % | DIASTOLIC BLOOD PRESSURE: 84 MMHG | BODY MASS INDEX: 40.85 KG/M2 | HEIGHT: 69 IN | TEMPERATURE: 98.3 F | WEIGHT: 275.8 LBS | HEART RATE: 71 BPM | RESPIRATION RATE: 14 BRPM

## 2023-04-26 DIAGNOSIS — I10 BENIGN ESSENTIAL HYPERTENSION: Primary | ICD-10-CM

## 2023-04-26 LAB
ANION GAP SERPL CALCULATED.3IONS-SCNC: 10 MMOL/L (ref 7–15)
BUN SERPL-MCNC: 21.5 MG/DL (ref 6–20)
CALCIUM SERPL-MCNC: 9.3 MG/DL (ref 8.6–10)
CHLORIDE SERPL-SCNC: 105 MMOL/L (ref 98–107)
CREAT SERPL-MCNC: 1.12 MG/DL (ref 0.67–1.17)
DEPRECATED HCO3 PLAS-SCNC: 28 MMOL/L (ref 22–29)
GFR SERPL CREATININE-BSD FRML MDRD: 82 ML/MIN/1.73M2
GLUCOSE SERPL-MCNC: 114 MG/DL (ref 70–99)
POTASSIUM SERPL-SCNC: 4.1 MMOL/L (ref 3.4–5.3)
SODIUM SERPL-SCNC: 143 MMOL/L (ref 136–145)

## 2023-04-26 PROCEDURE — 99214 OFFICE O/P EST MOD 30 MIN: CPT | Performed by: FAMILY MEDICINE

## 2023-04-26 PROCEDURE — 36415 COLL VENOUS BLD VENIPUNCTURE: CPT | Performed by: FAMILY MEDICINE

## 2023-04-26 PROCEDURE — 80048 BASIC METABOLIC PNL TOTAL CA: CPT | Performed by: FAMILY MEDICINE

## 2023-04-26 RX ORDER — HYDROCHLOROTHIAZIDE 12.5 MG/1
12.5 TABLET ORAL DAILY
Qty: 90 TABLET | Refills: 1 | Status: SHIPPED | OUTPATIENT
Start: 2023-04-26 | End: 2023-10-17

## 2023-04-26 ASSESSMENT — PAIN SCALES - GENERAL: PAINLEVEL: NO PAIN (0)

## 2023-04-26 NOTE — PROGRESS NOTES
"  Assessment & Plan   Problem List Items Addressed This Visit    None  Visit Diagnoses     Benign essential hypertension    -  Primary    Relevant Medications    hydrochlorothiazide (HYDRODIURIL) 12.5 MG tablet    Other Relevant Orders    Basic metabolic panel (Completed)           Adding hydrochlorothiazide to the lisinopril.  Checking labs today.     SIM DINERO MD  [facility] ESTER Rangel is a 47 year old, presenting for the following health issues:  Medication Update (Med check. Been seen at work because of a head injury. That doctor is worried about BP being high and thinks he needs to increase dose of medication. )        4/26/2023     1:53 PM   Additional Questions   Roomed by Candy Quick     History of Present Illness       Hypertension: He presents for follow up of hypertension.  He does not check blood pressure  regularly outside of the clinic. Outside blood pressures have been over 140/90. He does not follow a low salt diet.     He eats 0-1 servings of fruits and vegetables daily.He consumes 1 sweetened beverage(s) daily.He exercises with enough effort to increase his heart rate 9 or less minutes per day.  He exercises with enough effort to increase his heart rate 3 or less days per week.   He is taking medications regularly.              Objective    BP (!) 172/84 (BP Location: Right arm, Patient Position: Sitting, Cuff Size: Adult Regular)   Pulse 71   Temp 98.3  F (36.8  C) (Oral)   Resp 14   Ht 1.753 m (5' 9\")   Wt 125.1 kg (275 lb 12.8 oz)   SpO2 96%   BMI 40.73 kg/m    Body mass index is 40.73 kg/m .  Physical Exam   GENERAL: healthy, alert and no distress  NECK: no adenopathy, no asymmetry, masses, or scars and thyroid normal to palpation  RESP: lungs clear to auscultation - no rales, rhonchi or wheezes  CV: regular rate and rhythm, normal S1 S2, no S3 or S4, no murmur, click or rub, no peripheral edema and peripheral pulses strong               "

## 2023-05-24 ENCOUNTER — HOSPITAL ENCOUNTER (EMERGENCY)
Facility: CLINIC | Age: 48
Discharge: LEFT BEFORE DISCHARGE | End: 2023-05-24
Payer: COMMERCIAL

## 2023-05-24 VITALS
SYSTOLIC BLOOD PRESSURE: 185 MMHG | DIASTOLIC BLOOD PRESSURE: 97 MMHG | HEIGHT: 69 IN | HEART RATE: 90 BPM | OXYGEN SATURATION: 95 % | TEMPERATURE: 98 F | BODY MASS INDEX: 39.69 KG/M2 | RESPIRATION RATE: 16 BRPM | WEIGHT: 268 LBS

## 2023-05-24 PROCEDURE — 99281 EMR DPT VST MAYX REQ PHY/QHP: CPT

## 2023-05-25 NOTE — ED TRIAGE NOTES
Pt was hit in the nose with a baseball prior to arrival, had small amount of bleeding. Small abrasion to bridge of nose. No LOC.

## 2023-05-30 ENCOUNTER — ALLIED HEALTH/NURSE VISIT (OUTPATIENT)
Dept: FAMILY MEDICINE | Facility: CLINIC | Age: 48
End: 2023-05-30
Payer: COMMERCIAL

## 2023-05-30 VITALS — OXYGEN SATURATION: 96 % | SYSTOLIC BLOOD PRESSURE: 158 MMHG | HEART RATE: 68 BPM | DIASTOLIC BLOOD PRESSURE: 86 MMHG

## 2023-05-30 DIAGNOSIS — I15.9 SECONDARY HYPERTENSION: ICD-10-CM

## 2023-05-30 DIAGNOSIS — Z01.30 BP CHECK: Primary | ICD-10-CM

## 2023-05-30 PROCEDURE — 99207 PR NO CHARGE NURSE ONLY: CPT

## 2023-05-30 RX ORDER — LISINOPRIL 40 MG/1
40 TABLET ORAL DAILY
Qty: 90 TABLET | Refills: 3 | Status: SHIPPED | OUTPATIENT
Start: 2023-05-30 | End: 2023-10-25

## 2023-05-30 NOTE — PROGRESS NOTES
Alfredo Velasquez is a 47 year old patient who comes in today for a Blood Pressure check.  Initial BP:  BP (!) 156/90   Pulse 77   SpO2 96%      77  Disposition: results routed to provider    Alfredo Velasquez is a 47 year old patient who comes in today for a Blood Pressure check.  Initial BP:  BP (!) 146/88   Pulse 77   SpO2 96%      77  Disposition: BP elevated.  Triage RN notified, patient asked to wait    Vital Signs as repeated by /86, pulse 68  Patient is taking medication as prescribed  Patient is tolerating medications well.     Current complaints: headaches and swelling or edema on left ankle more than right.  But has old injury to that leg.  Headaches have been sporatic since getting hit in the face with a baseball last week.  Disposition:  Home - message to provider sent.    At home readings:   05/01/2023 167/96    05/28/2023 157/91      Patient is open to increasing medication and will come back for blood pressure checks as requested.     ROMA RuedaN RN  [facility]

## 2023-05-30 NOTE — PROGRESS NOTES
Called and spoke with pt. Pt states understanding and BP follow up appt scheduled for two weeks out.

## 2023-05-30 NOTE — PROGRESS NOTES
Pressure not at goal.  I will increase lisinopril to 40 mg once a day.  continue with hydrochlorothiazide.New prescription sent to the pharmacy.  Recheck blood pressure in 2 weeks in the clinic

## 2023-06-13 ENCOUNTER — ALLIED HEALTH/NURSE VISIT (OUTPATIENT)
Dept: FAMILY MEDICINE | Facility: CLINIC | Age: 48
End: 2023-06-13
Payer: COMMERCIAL

## 2023-06-13 ENCOUNTER — LAB (OUTPATIENT)
Dept: LAB | Facility: CLINIC | Age: 48
End: 2023-06-13
Payer: COMMERCIAL

## 2023-06-13 VITALS
WEIGHT: 272 LBS | SYSTOLIC BLOOD PRESSURE: 130 MMHG | OXYGEN SATURATION: 96 % | HEART RATE: 61 BPM | DIASTOLIC BLOOD PRESSURE: 80 MMHG | BODY MASS INDEX: 40.17 KG/M2

## 2023-06-13 DIAGNOSIS — R31.9 HEMATURIA, UNSPECIFIED TYPE: Primary | ICD-10-CM

## 2023-06-13 DIAGNOSIS — I15.9 SECONDARY HYPERTENSION: Primary | ICD-10-CM

## 2023-06-13 DIAGNOSIS — R31.9 HEMATURIA, UNSPECIFIED TYPE: ICD-10-CM

## 2023-06-13 LAB
ALBUMIN UR-MCNC: NEGATIVE MG/DL
APPEARANCE UR: CLEAR
BACTERIA #/AREA URNS HPF: ABNORMAL /HPF
BILIRUB UR QL STRIP: NEGATIVE
COLOR UR AUTO: YELLOW
GLUCOSE UR STRIP-MCNC: NEGATIVE MG/DL
HGB UR QL STRIP: ABNORMAL
KETONES UR STRIP-MCNC: NEGATIVE MG/DL
LEUKOCYTE ESTERASE UR QL STRIP: NEGATIVE
MUCOUS THREADS #/AREA URNS LPF: PRESENT /LPF
NITRATE UR QL: NEGATIVE
PH UR STRIP: 6.5 [PH] (ref 5–8)
RBC #/AREA URNS AUTO: ABNORMAL /HPF
SP GR UR STRIP: 1.02 (ref 1–1.03)
UROBILINOGEN UR STRIP-ACNC: 0.2 E.U./DL
WBC #/AREA URNS AUTO: ABNORMAL /HPF

## 2023-06-13 PROCEDURE — 81001 URINALYSIS AUTO W/SCOPE: CPT

## 2023-06-13 PROCEDURE — 99207 PR NO CHARGE NURSE ONLY: CPT

## 2023-06-13 ASSESSMENT — PAIN SCALES - GENERAL: PAINLEVEL: NO PAIN (0)

## 2023-06-13 NOTE — PROGRESS NOTES
Alfredo Velasquez is a 47 year old patient who comes in today for a Blood Pressure check.  Initial BP:  /80   Pulse 61   Wt 123.4 kg (272 lb)   SpO2 96%   BMI 40.17 kg/m       61  Disposition: results routed to provider    Pt had increase in his lisinopril (ZESTRIL) 40 MG tablet.  Seems to be tolerating ok, slight headache at times.   But doing fine.       Needed recheck on urine from last Nov. Orders placed  See results and inform pt. On results

## 2023-06-19 DIAGNOSIS — I15.9 SECONDARY HYPERTENSION: ICD-10-CM

## 2023-06-19 NOTE — TELEPHONE ENCOUNTER
[facility] requested a refill of lisinopril 20 mg for the patient on 6/19/23. Per patient records, the patient was prescribed lisinopril 40 mg as of 5/30/23 (see nurse visit on 5/30/23.)    Please route to the RN queue when patient calls back to find out how much lisinopril he is actually taking.    Oliva Morales, RN, BSN  [facility]

## 2023-06-21 RX ORDER — LISINOPRIL 20 MG/1
TABLET ORAL
Qty: 90 TABLET | Refills: 0 | OUTPATIENT
Start: 2023-06-21

## 2023-06-21 NOTE — TELEPHONE ENCOUNTER
Refill request Refused - Dose was changed to 40mg daily on 5/30/2023    Lisinopril 40mg was refilled on 5/30/2023 - Disp 90, Ref 3  Sent to [facility] in [place], [place]    Renetta Fajardo RN  06/21/23 9:49 AM  [facility] Nurse Advisor

## 2023-07-13 ENCOUNTER — MYC MEDICAL ADVICE (OUTPATIENT)
Dept: FAMILY MEDICINE | Facility: CLINIC | Age: 48
End: 2023-07-13
Payer: COMMERCIAL

## 2023-07-19 ENCOUNTER — VIRTUAL VISIT (OUTPATIENT)
Dept: FAMILY MEDICINE | Facility: CLINIC | Age: 48
End: 2023-07-19
Payer: COMMERCIAL

## 2023-07-19 DIAGNOSIS — G47.33 OSA (OBSTRUCTIVE SLEEP APNEA): Primary | ICD-10-CM

## 2023-07-19 PROCEDURE — 99213 OFFICE O/P EST LOW 20 MIN: CPT | Mod: VID | Performed by: PHYSICIAN ASSISTANT

## 2023-07-19 ASSESSMENT — ENCOUNTER SYMPTOMS
NEUROLOGICAL NEGATIVE: 1
RESPIRATORY NEGATIVE: 1
CARDIOVASCULAR NEGATIVE: 1

## 2023-07-19 NOTE — PROGRESS NOTES
"Juan Carlos is a 47 year old who is being evaluated via a billable video visit.      How would you like to obtain your AVS? MyChart  If the video visit is dropped, the invitation should be resent by: Text to cell phone: [phone]  Will anyone else be joining your video visit? No  {If patient encounters technical issues they should call [phone] :678299}        {PROVIDER CHARTING PREFERENCE:947646}    Subjective   Juan Carlos is a 47 year old, presenting for the following health issues:  Sleep Study (Pt reports they need proof that in the last 30 days in the last 90 days)        6/13/2023    10:11 AM   Additional Questions   Roomed by nohemi adams ma     History of Present Illness       Reason for visit:  Cpap machine check    He eats 0-1 servings of fruits and vegetables daily.He consumes 2 sweetened beverage(s) daily.He exercises with enough effort to increase his heart rate 9 or less minutes per day.  He exercises with enough effort to increase his heart rate 3 or less days per week.   He is taking medications regularly.       {SUPERLIST (Optional):761580}  {additonal problems for provider to add (Optional):471482}      Review of Systems   {ROS COMP (Optional):461176}      Objective           Vitals:  No vitals were obtained today due to virtual visit.    Physical Exam   {video visit exam brief selected:639409::\"GENERAL: Healthy, alert and no distress\",\"EYES: Eyes grossly normal to inspection.  No discharge or erythema, or obvious scleral/conjunctival abnormalities.\",\"RESP: No audible wheeze, cough, or visible cyanosis.  No visible retractions or increased work of breathing.  \",\"SKIN: Visible skin clear. No significant rash, abnormal pigmentation or lesions.\",\"NEURO: Cranial nerves grossly intact.  Mentation and speech appropriate for age.\",\"PSYCH: Mentation appears normal, affect normal/bright, judgement and insight intact, normal speech and appearance well-groomed.\"}    {Diagnostic Test Results " "(Optional):148088}    {AMBULATORY ATTESTATION (Optional):976752}        Video-Visit Details    Type of service:  Video Visit     Originating Location (pt. Location): {video visit patient location:289971::\"Home\"}  {PROVIDER LOCATION On-site should be selected for visits conducted from your clinic location or adjoining [facility] hospital, academic office, or other nearby [facility] building. Off-site should be selected for all other provider locations, including home:697967}  Distant Location (provider location):  {virtual location provider:267711}  Platform used for Video Visit: {Virtual Visit Platforms:757123::\"Fewzion\"}    "

## 2023-10-15 DIAGNOSIS — I10 BENIGN ESSENTIAL HYPERTENSION: ICD-10-CM

## 2023-10-17 RX ORDER — HYDROCHLOROTHIAZIDE 12.5 MG/1
12.5 TABLET ORAL DAILY
Qty: 90 TABLET | Refills: 1 | Status: SHIPPED | OUTPATIENT
Start: 2023-10-17 | End: 2023-10-25

## 2023-10-21 ENCOUNTER — HEALTH MAINTENANCE LETTER (OUTPATIENT)
Age: 48
End: 2023-10-21

## 2023-10-24 PROBLEM — I10 HIGH BLOOD PRESSURE: Status: RESOLVED | Noted: 2017-12-07 | Resolved: 2023-10-24

## 2023-10-24 PROBLEM — N50.812 PAIN IN LEFT TESTICLE: Status: RESOLVED | Noted: 2022-11-16 | Resolved: 2023-10-24

## 2023-10-24 ASSESSMENT — ENCOUNTER SYMPTOMS
NAUSEA: 0
EYE PAIN: 0
SORE THROAT: 0
NERVOUS/ANXIOUS: 0
PALPITATIONS: 0
DIARRHEA: 0
MYALGIAS: 1
DYSURIA: 0
HEADACHES: 0
FEVER: 0
SHORTNESS OF BREATH: 0
CONSTIPATION: 0
HEMATURIA: 0
COUGH: 0
ARTHRALGIAS: 1
JOINT SWELLING: 1
FREQUENCY: 0
PARESTHESIAS: 0
HEARTBURN: 0
CHILLS: 0
WEAKNESS: 0
ABDOMINAL PAIN: 0
DIZZINESS: 0
HEMATOCHEZIA: 0

## 2023-10-25 ENCOUNTER — OFFICE VISIT (OUTPATIENT)
Dept: FAMILY MEDICINE | Facility: CLINIC | Age: 48
End: 2023-10-25
Payer: COMMERCIAL

## 2023-10-25 ENCOUNTER — LAB (OUTPATIENT)
Dept: FAMILY MEDICINE | Facility: CLINIC | Age: 48
End: 2023-10-25

## 2023-10-25 VITALS
RESPIRATION RATE: 14 BRPM | HEART RATE: 61 BPM | HEIGHT: 69 IN | SYSTOLIC BLOOD PRESSURE: 140 MMHG | WEIGHT: 270 LBS | DIASTOLIC BLOOD PRESSURE: 78 MMHG | TEMPERATURE: 98.2 F | BODY MASS INDEX: 39.99 KG/M2 | OXYGEN SATURATION: 96 %

## 2023-10-25 DIAGNOSIS — I10 BENIGN ESSENTIAL HYPERTENSION: ICD-10-CM

## 2023-10-25 DIAGNOSIS — Z12.11 SCREEN FOR COLON CANCER: ICD-10-CM

## 2023-10-25 DIAGNOSIS — Z12.5 SCREENING FOR PROSTATE CANCER: ICD-10-CM

## 2023-10-25 DIAGNOSIS — E66.01 MORBID OBESITY (H): ICD-10-CM

## 2023-10-25 DIAGNOSIS — G47.33 OSA (OBSTRUCTIVE SLEEP APNEA): ICD-10-CM

## 2023-10-25 DIAGNOSIS — I15.9 SECONDARY HYPERTENSION: ICD-10-CM

## 2023-10-25 DIAGNOSIS — Z00.00 ANNUAL PHYSICAL EXAM: Primary | ICD-10-CM

## 2023-10-25 DIAGNOSIS — Z13.1 SCREENING FOR DIABETES MELLITUS: ICD-10-CM

## 2023-10-25 DIAGNOSIS — R31.29 MICROSCOPIC HEMATURIA: ICD-10-CM

## 2023-10-25 DIAGNOSIS — E78.00 PURE HYPERCHOLESTEROLEMIA: ICD-10-CM

## 2023-10-25 LAB
ALBUMIN SERPL BCG-MCNC: 4.3 G/DL (ref 3.5–5.2)
ALP SERPL-CCNC: 96 U/L (ref 40–129)
ALT SERPL W P-5'-P-CCNC: 25 U/L (ref 0–70)
ANION GAP SERPL CALCULATED.3IONS-SCNC: 12 MMOL/L (ref 7–15)
AST SERPL W P-5'-P-CCNC: 24 U/L (ref 0–45)
BILIRUB SERPL-MCNC: 0.6 MG/DL
BUN SERPL-MCNC: 17 MG/DL (ref 6–20)
CALCIUM SERPL-MCNC: 9.6 MG/DL (ref 8.6–10)
CHLORIDE SERPL-SCNC: 103 MMOL/L (ref 98–107)
CHOLEST SERPL-MCNC: 214 MG/DL
CREAT SERPL-MCNC: 1.11 MG/DL (ref 0.67–1.17)
DEPRECATED HCO3 PLAS-SCNC: 27 MMOL/L (ref 22–29)
EGFRCR SERPLBLD CKD-EPI 2021: 82 ML/MIN/1.73M2
ERYTHROCYTE [DISTWIDTH] IN BLOOD BY AUTOMATED COUNT: 12 % (ref 10–15)
GLUCOSE SERPL-MCNC: 99 MG/DL (ref 70–99)
HBA1C MFR BLD: 5.8 % (ref 0–5.6)
HCT VFR BLD AUTO: 45.5 % (ref 40–53)
HDLC SERPL-MCNC: 36 MG/DL
HGB BLD-MCNC: 15.4 G/DL (ref 13.3–17.7)
LDLC SERPL CALC-MCNC: 155 MG/DL
MCH RBC QN AUTO: 29.8 PG (ref 26.5–33)
MCHC RBC AUTO-ENTMCNC: 33.8 G/DL (ref 31.5–36.5)
MCV RBC AUTO: 88 FL (ref 78–100)
NONHDLC SERPL-MCNC: 178 MG/DL
PLATELET # BLD AUTO: 180 10E3/UL (ref 150–450)
POTASSIUM SERPL-SCNC: 3.8 MMOL/L (ref 3.4–5.3)
PROT SERPL-MCNC: 7.3 G/DL (ref 6.4–8.3)
PSA SERPL DL<=0.01 NG/ML-MCNC: 0.43 NG/ML (ref 0–2.5)
RBC # BLD AUTO: 5.16 10E6/UL (ref 4.4–5.9)
SODIUM SERPL-SCNC: 142 MMOL/L (ref 135–145)
TRIGL SERPL-MCNC: 115 MG/DL
WBC # BLD AUTO: 6.9 10E3/UL (ref 4–11)

## 2023-10-25 PROCEDURE — 99214 OFFICE O/P EST MOD 30 MIN: CPT | Mod: 25 | Performed by: PHYSICIAN ASSISTANT

## 2023-10-25 PROCEDURE — G0103 PSA SCREENING: HCPCS | Performed by: PHYSICIAN ASSISTANT

## 2023-10-25 PROCEDURE — 80053 COMPREHEN METABOLIC PANEL: CPT | Performed by: PHYSICIAN ASSISTANT

## 2023-10-25 PROCEDURE — 83036 HEMOGLOBIN GLYCOSYLATED A1C: CPT | Performed by: PHYSICIAN ASSISTANT

## 2023-10-25 PROCEDURE — 99396 PREV VISIT EST AGE 40-64: CPT | Mod: 25 | Performed by: PHYSICIAN ASSISTANT

## 2023-10-25 PROCEDURE — 90480 ADMN SARSCOV2 VAC 1/ONLY CMP: CPT | Performed by: PHYSICIAN ASSISTANT

## 2023-10-25 PROCEDURE — 80061 LIPID PANEL: CPT | Performed by: PHYSICIAN ASSISTANT

## 2023-10-25 PROCEDURE — 90471 IMMUNIZATION ADMIN: CPT | Performed by: PHYSICIAN ASSISTANT

## 2023-10-25 PROCEDURE — 90686 IIV4 VACC NO PRSV 0.5 ML IM: CPT | Performed by: PHYSICIAN ASSISTANT

## 2023-10-25 PROCEDURE — 91320 SARSCV2 VAC 30MCG TRS-SUC IM: CPT | Performed by: PHYSICIAN ASSISTANT

## 2023-10-25 PROCEDURE — 85027 COMPLETE CBC AUTOMATED: CPT | Performed by: PHYSICIAN ASSISTANT

## 2023-10-25 PROCEDURE — 36415 COLL VENOUS BLD VENIPUNCTURE: CPT | Performed by: PHYSICIAN ASSISTANT

## 2023-10-25 RX ORDER — HYDROCHLOROTHIAZIDE 12.5 MG/1
12.5 TABLET ORAL DAILY
Qty: 90 TABLET | Refills: 3 | Status: SHIPPED | OUTPATIENT
Start: 2023-10-25

## 2023-10-25 RX ORDER — LISINOPRIL 40 MG/1
40 TABLET ORAL DAILY
Qty: 90 TABLET | Refills: 3 | Status: SHIPPED | OUTPATIENT
Start: 2023-10-25

## 2023-10-25 ASSESSMENT — ENCOUNTER SYMPTOMS
PARESTHESIAS: 0
SORE THROAT: 0
JOINT SWELLING: 1
EYE PAIN: 0
FREQUENCY: 0
HEADACHES: 0
DIARRHEA: 0
CONSTIPATION: 0
COUGH: 0
MYALGIAS: 1
HEMATURIA: 0
WEAKNESS: 0
NAUSEA: 0
CHILLS: 0
NERVOUS/ANXIOUS: 0
FEVER: 0
SHORTNESS OF BREATH: 0
ABDOMINAL PAIN: 0
PALPITATIONS: 0
ARTHRALGIAS: 1
DYSURIA: 0
HEMATOCHEZIA: 0
DIZZINESS: 0
HEARTBURN: 0

## 2023-10-25 NOTE — PROGRESS NOTES
SUBJECTIVE:   CC: Juan Carlos is an 48 year old who presents for preventative health visit.       10/25/2023     8:41 AM   Additional Questions   Roomed by Dolores Nguyen   Accompanied by valentin       Rohit is a pleasant 48-year-old male who presents to the clinic today for annual physical.  Past medical history significant for hypertension, hyperlipidemia, and obstructive sleep apnea.  He is feeling well.  No questions or concerns regarding his chronic health today.    He is currently on aspirin, hydrochlorothiazide and lisinopril.  He does have blood pressure capabilities at home but he has not been checking this regularly.  Blood pressure is borderline today at 140/78.  He does have a CPAP which she is compliant using.    Healthy Habits:     Getting at least 3 servings of Calcium per day:  NO    Bi-annual eye exam:  NO    Dental care twice a year:  NO    Sleep apnea or symptoms of sleep apnea:  Sleep apnea    Diet:  Regular (no restrictions)    Frequency of exercise:  None    Taking medications regularly:  No    Barriers to taking medications:  None    Medication side effects:  Not applicable    Additional concerns today:  No          Social History     Tobacco Use    Smoking status: Never     Passive exposure: Never    Smokeless tobacco: Never   Substance Use Topics    Alcohol use: Yes     Comment: social             10/24/2023     7:56 PM   Alcohol Use   Prescreen: >3 drinks/day or >7 drinks/week? Not Applicable       Last PSA:   Prostate Specific Antigen Screen   Date Value Ref Range Status   10/24/2022 0.69 0.00 - 2.50 ng/mL Final   07/08/2020 0.5 0.0 - 2.5 ng/mL Final       Reviewed orders with patient. Reviewed health maintenance and updated orders accordingly - Yes  Lab work is in process    Reviewed and updated as needed this visit by clinical staff   Tobacco  Allergies  Meds              Reviewed and updated as needed this visit by Provider                     Review of Systems   Constitutional:  Negative for  "chills and fever.   HENT:  Negative for congestion, ear pain, hearing loss and sore throat.    Eyes:  Negative for pain and visual disturbance.   Respiratory:  Negative for cough and shortness of breath.    Cardiovascular:  Negative for chest pain, palpitations and peripheral edema.   Gastrointestinal:  Negative for abdominal pain, constipation, diarrhea, heartburn, hematochezia and nausea.   Genitourinary:  Negative for dysuria, frequency, genital sores, hematuria, impotence, penile discharge and urgency.   Musculoskeletal:  Positive for arthralgias, joint swelling and myalgias.   Skin:  Negative for rash.   Neurological:  Negative for dizziness, weakness, headaches and paresthesias.   Psychiatric/Behavioral:  Negative for mood changes. The patient is not nervous/anxious.          OBJECTIVE:   BP (!) 140/78   Pulse 61   Temp 98.2  F (36.8  C) (Oral)   Resp 14   Ht 1.753 m (5' 9\")   Wt 122.5 kg (270 lb)   SpO2 96%   BMI 39.87 kg/m      Physical Exam  Vitals and nursing note reviewed.   Constitutional:       General: He is not in acute distress.     Appearance: Normal appearance. He is well-developed and well-groomed. He is not ill-appearing or toxic-appearing.   HENT:      Head: Normocephalic and atraumatic.      Right Ear: Tympanic membrane, ear canal and external ear normal.      Left Ear: Tympanic membrane, ear canal and external ear normal.      Mouth/Throat:      Lips: Pink.      Mouth: Mucous membranes are moist.      Palate: No mass.      Pharynx: Oropharynx is clear. Uvula midline.      Tonsils: No tonsillar exudate or tonsillar abscesses.   Eyes:      General: Lids are normal.         Right eye: No discharge.         Left eye: No discharge.   Neck:      Trachea: Trachea normal.   Cardiovascular:      Rate and Rhythm: Normal rate and regular rhythm.      Heart sounds: S1 normal and S2 normal. No murmur heard.  Pulmonary:      Effort: Pulmonary effort is normal.      Breath sounds: Normal breath sounds " and air entry.   Abdominal:      General: Bowel sounds are normal. There is no distension.      Palpations: Abdomen is soft.      Tenderness: There is no abdominal tenderness. There is no guarding or rebound.   Musculoskeletal:      Cervical back: Full passive range of motion without pain and neck supple.      Right lower leg: No edema.      Left lower leg: No edema.   Lymphadenopathy:      Cervical: No cervical adenopathy.   Skin:     General: Skin is warm and dry.      Findings: No lesion or rash.   Neurological:      General: No focal deficit present.      Mental Status: He is alert.      Cranial Nerves: No cranial nerve deficit.      Gait: Gait is intact.      Deep Tendon Reflexes:      Reflex Scores:       Patellar reflexes are 2+ on the right side and 2+ on the left side.  Psychiatric:         Attention and Perception: Attention normal.         Mood and Affect: Mood normal.         Speech: Speech normal.         ASSESSMENT/PLAN:     Annual physical exam  We will check annual labs including CBC, complete metabolic panel, lipid, PSA and A1c.  - CBC with platelets; Future  - Comprehensive metabolic panel (BMP + Alb, Alk Phos, ALT, AST, Total. Bili, TP); Future  - Lipid panel reflex to direct LDL Fasting; Future  - Hemoglobin A1c; Future  - CBC with platelets  - Comprehensive metabolic panel (BMP + Alb, Alk Phos, ALT, AST, Total. Bili, TP)  - Lipid panel reflex to direct LDL Fasting  - Hemoglobin A1c    Essential Hypercholesterolemia  We will check fasting labs today.  Last lipid panel was mildly elevated with an LDL of 136, triglyceride of 275, HDL of 38.  We did discuss diet and activity he would like to work on this instead of starting medication.  I think this would be reasonable.  - Lipid panel reflex to direct LDL Fasting; Future  - Lipid panel reflex to direct LDL Fasting    Microscopic hematuria  He has had full work-up including CT urogram, cystoscopy and repeat urinalysis.  He is due to follow-up with  urology in the next month or so and he plans to schedule this.    Benign essential hypertension  Blood pressure is borderline at 140/78.  We will have him continue hydrochlorothiazide and lisinopril.  We will check CMP today.  We discussed decreasing salt and alcohol and increasing activity.  He is to continue to monitor blood pressure.  If blood pressure increases greater than 140/90 he is to let me know.  - Comprehensive metabolic panel (BMP + Alb, Alk Phos, ALT, AST, Total. Bili, TP); Future  - hydrochlorothiazide (HYDRODIURIL) 12.5 MG tablet; Take 1 tablet (12.5 mg) by mouth daily  - Comprehensive metabolic panel (BMP + Alb, Alk Phos, ALT, AST, Total. Bili, TP)  - lisinopril (ZESTRIL) 40 MG tablet; Take 1 tablet (40 mg) by mouth daily    KRAIG (obstructive sleep apnea)  Compliant with CPAP    Screening for diabetes mellitus  - Hemoglobin A1c; Future  - Hemoglobin A1c    Screen for colon cancer  - YIFAN([employer]); Future    Morbid obesity (H)  BMI is 39.  Diet and exercise discussed today.    Screening for prostate cancer  - PSA, screen; Future  - PSA, screen     Follow-up Visit   Expected date:  Oct 25, 2024 (Approximate)      Follow Up Appointment Details:     Follow-up with whom?: Me    Follow-Up for what?: Adult Preventive    How?: In Person                     Current Outpatient Medications   Medication    aspirin 81 MG EC tablet    hydrochlorothiazide (HYDRODIURIL) 12.5 MG tablet    lisinopril (ZESTRIL) 40 MG tablet    fexofenadine (ALLEGRA) 180 MG tablet     No current facility-administered medications for this visit.       Patient has been advised of split billing requirements and indicates understanding: Yes      COUNSELING:   Reviewed preventive health counseling, as reflected in patient instructions       Regular exercise       Healthy diet/nutrition       Vision screening       Colorectal cancer screening       Prostate cancer screening      BMI:   Estimated body mass index is 39.87 kg/m  as  "calculated from the following:    Height as of this encounter: 1.753 m (5' 9\").    Weight as of this encounter: 122.5 kg (270 lb).   Weight management plan: Discussed healthy diet and exercise guidelines      He reports that he has never smoked. He has never been exposed to tobacco smoke. He has never used smokeless tobacco.            Shen Hung PA-C  [facility]  "

## 2023-10-25 NOTE — LETTER
October 26, 2023      Juan Carlos Velasquez  [address] [place] [address]        Dear AristeoEva,    We are writing to inform you of your test results.    Complete Blood Count: White blood cell, red blood cell, platelets, and hemoglobin are stable.   Metabolic panel : Kidney function, liver function, electrolytes are stable.    Lipids: Lipid panel is stable but elevated. Work on diet and exercise to help improve this.   PSA: Your prostate screening lab was within normal limit   A1c is stable at 5.8     Resulted Orders   CBC with platelets   Result Value Ref Range    WBC Count 6.9 4.0 - 11.0 10e3/uL    RBC Count 5.16 4.40 - 5.90 10e6/uL    Hemoglobin 15.4 13.3 - 17.7 g/dL    Hematocrit 45.5 40.0 - 53.0 %    MCV 88 78 - 100 fL    MCH 29.8 26.5 - 33.0 pg    MCHC 33.8 31.5 - 36.5 g/dL    RDW 12.0 10.0 - 15.0 %    Platelet Count 180 150 - 450 10e3/uL   Comprehensive metabolic panel (BMP + Alb, Alk Phos, ALT, AST, Total. Bili, TP)   Result Value Ref Range    Sodium 142 135 - 145 mmol/L      Comment:      Reference intervals for this test were updated on 09/26/2023 to more accurately reflect our healthy population. There may be differences in the flagging of prior results with similar values performed with this method. Interpretation of those prior results can be made in the context of the updated reference intervals.     Potassium 3.8 3.4 - 5.3 mmol/L    Carbon Dioxide (CO2) 27 22 - 29 mmol/L    Anion Gap 12 7 - 15 mmol/L    Urea Nitrogen 17.0 6.0 - 20.0 mg/dL    Creatinine 1.11 0.67 - 1.17 mg/dL    GFR Estimate 82 >60 mL/min/1.73m2    Calcium 9.6 8.6 - 10.0 mg/dL    Chloride 103 98 - 107 mmol/L    Glucose 99 70 - 99 mg/dL    Alkaline Phosphatase 96 40 - 129 U/L    AST 24 0 - 45 U/L      Comment:      Reference intervals for this test were updated on 6/12/2023 to more accurately reflect our healthy population. There may be differences in the flagging of prior results with similar values performed with this method.  Interpretation of those prior results can be made in the context of the updated reference intervals.    ALT 25 0 - 70 U/L      Comment:      Reference intervals for this test were updated on 6/12/2023 to more accurately reflect our healthy population. There may be differences in the flagging of prior results with similar values performed with this method. Interpretation of those prior results can be made in the context of the updated reference intervals.      Protein Total 7.3 6.4 - 8.3 g/dL    Albumin 4.3 3.5 - 5.2 g/dL    Bilirubin Total 0.6 <=1.2 mg/dL   Lipid panel reflex to direct LDL Fasting   Result Value Ref Range    Cholesterol 214 (H) <200 mg/dL    Triglycerides 115 <150 mg/dL    Direct Measure HDL 36 (L) >=40 mg/dL    LDL Cholesterol Calculated 155 (H) <=100 mg/dL    Non HDL Cholesterol 178 (H) <130 mg/dL    Narrative    Cholesterol  Desirable:  <200 mg/dL    Triglycerides  Normal:  Less than 150 mg/dL  Borderline High:  150-199 mg/dL  High:  200-499 mg/dL  Very High:  Greater than or equal to 500 mg/dL    Direct Measure HDL  Female:  Greater than or equal to 50 mg/dL   Male:  Greater than or equal to 40 mg/dL    LDL Cholesterol  Desirable:  <100mg/dL  Above Desirable:  100-129 mg/dL   Borderline High:  130-159 mg/dL   High:  160-189 mg/dL   Very High:  >= 190 mg/dL    Non HDL Cholesterol  Desirable:  130 mg/dL  Above Desirable:  130-159 mg/dL  Borderline High:  160-189 mg/dL  High:  190-219 mg/dL  Very High:  Greater than or equal to 220 mg/dL   Hemoglobin A1c   Result Value Ref Range    Hemoglobin A1C 5.8 (H) 0.0 - 5.6 %      Comment:      Normal <5.7%   Prediabetes 5.7-6.4%    Diabetes 6.5% or higher     Note: Adopted from ADA consensus guidelines.   PSA, screen   Result Value Ref Range    Prostate Specific Antigen Screen 0.43 0.00 - 2.50 ng/mL    Narrative    This result is obtained using the Roche Elecsys total PSA method on the moi [id number] immunoassay analyzer. Results obtained with different assay  methods or kits cannot be used interchangeably.       If you have any questions or concerns, please call the clinic at the number listed above.       Sincerely,      Shen Hung PA-C

## 2023-11-08 LAB — NONINV COLON CA DNA+OCC BLD SCRN STL QL: NEGATIVE

## 2024-05-10 ENCOUNTER — OFFICE VISIT (OUTPATIENT)
Dept: FAMILY MEDICINE | Facility: CLINIC | Age: 49
End: 2024-05-10
Payer: COMMERCIAL

## 2024-05-10 VITALS
HEART RATE: 71 BPM | DIASTOLIC BLOOD PRESSURE: 90 MMHG | OXYGEN SATURATION: 96 % | TEMPERATURE: 98.7 F | SYSTOLIC BLOOD PRESSURE: 160 MMHG | RESPIRATION RATE: 18 BRPM

## 2024-05-10 DIAGNOSIS — R05.1 ACUTE COUGH: Primary | ICD-10-CM

## 2024-05-10 DIAGNOSIS — J02.9 SORE THROAT: ICD-10-CM

## 2024-05-10 LAB
DEPRECATED S PYO AG THROAT QL EIA: NEGATIVE
GROUP A STREP BY PCR: NOT DETECTED

## 2024-05-10 PROCEDURE — 99213 OFFICE O/P EST LOW 20 MIN: CPT | Performed by: PHYSICIAN ASSISTANT

## 2024-05-10 PROCEDURE — 87651 STREP A DNA AMP PROBE: CPT | Performed by: PHYSICIAN ASSISTANT

## 2024-05-10 RX ORDER — BENZONATATE 100 MG/1
100 CAPSULE ORAL 3 TIMES DAILY PRN
Qty: 30 CAPSULE | Refills: 0 | Status: SHIPPED | OUTPATIENT
Start: 2024-05-10 | End: 2024-05-20

## 2024-05-10 RX ORDER — ALBUTEROL SULFATE 90 UG/1
2 AEROSOL, METERED RESPIRATORY (INHALATION) EVERY 6 HOURS
Qty: 18 G | Refills: 0 | Status: SHIPPED | OUTPATIENT
Start: 2024-05-10 | End: 2024-05-28

## 2024-05-10 NOTE — PROGRESS NOTES
"Patient presents with:  Cough: 1 week       Clinical Decision Making:  Strep test was obtained and was negative.  Culture is to follow.  COVID-19 screening test is pending.  Symptomatic care was gone over. Expected course of resolution and indication for return was gone over and questions were answered to patient/parent's satisfaction before discharge.        ICD-10-CM    1. Acute cough  R05.1 albuterol (PROAIR HFA/PROVENTIL HFA/VENTOLIN HFA) 108 (90 Base) MCG/ACT inhaler     benzonatate (TESSALON) 100 MG capsule      2. Sore throat  J02.9 Streptococcus A Rapid Screen w/Reflex to PCR - Clinic Collect     Group A Streptococcus PCR Throat Swab          Patient Instructions   You were seen today for acute bronchitis. This is likely due to a viral illness.    Symptom management:  - Get plenty of rest  - Avoid smoking and second hand smoke  - May take tylenol or ibuprofen for fever/discomfort  - Drink plenty of non-caffeinated fluids  - Use nasal steroid spray for sinus congestion  - Albuterol inhaler may be used every 6 hours as needed for chest tightness      Reasons to be seen in the emergency room:  - Develop a fever of 100.4 or higher  - Cough changes, coughing up blood, or become short of breath  - Neck stiffness  - Chest pain  - Severe headache  - Unable to tolerate eating or drinking fluids    Otherwise, if no symptom improvement after 5 days, follow-up with your primary care provider.        HPI:  Alfredo Velasquez is a 48 year old male who presents today for a 1 week history of cough that is productive of off-color yellow phlegm.  Patient has had sore throat and odynophagia in addition to the cough.  Denies headache fever chills night sweats myalgias arthralgias abdominal pain skin rash or urinary symptoms.  Treatment at home is consisted of use of Akanksha-Belton cold and flu and a over-the-counter allergy medicine for the \"itchy throat\".  Patient has not had relief with the over-the-counter medications.  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  Has not done a at home COVID test and declines COVID testing.  Does use CPAP at night for his obstructive sleep apnea.    History obtained from chart review and the patient.    Problem List:  2022-11: Microscopic hematuria  2022-11: Pain in left testicle  2021-02: Obesity (BMI 35.0-39.9) with comorbidity (H)  2017-12: High blood pressure  Essential Hypercholesterolemia  Foot Pain (Soft Tissue)  Pes Planus  Acute Sore Throat      Past Medical History:   Diagnosis Date    Acute Sore Throat     Created by Conversion     Essential Hypercholesterolemia     Created by Conversion     Foot Pain (Soft Tissue)     Created by Conversion [facility] Annotation: Jul 18 2013 12:03PM - [place] Paul: for years     High blood pressure 12/07/2017    High blood pressure     Obesity (BMI 35.0-39.9) with comorbidity (H) 02/16/2021    Pes Planus     Created by Conversion     Renal disease     Sleep apnea        Social History     Tobacco Use    Smoking status: Never     Passive exposure: Never    Smokeless tobacco: Never   Substance Use Topics    Alcohol use: Yes     Comment: social       Review of Systems  As above in HPI otherwise negative.    Vitals:    05/10/24 1242   BP: (!) 160/90   Pulse: 71   Resp: 18   Temp: 98.7  F (37.1  C)   SpO2: 96%       General: Patient is resting comfortably no acute distress is afebrile  HEENT: Head is normocephalic atraumatic   eyes are PERRL EOMI sclera anicteric   TMs are clear bilaterally  Throat is with mild pharyngeal wall erythema and no exudate  No cervical lymphadenopathy present  LUNGS: Clear to auscultation bilaterally  HEART: Regular rate and rhythm  Skin: Without rash non-diaphoretic    Physical Exam      Labs:  Results for orders placed or performed in visit on 05/10/24   Streptococcus A Rapid Screen w/Reflex to PCR - Clinic Collect     Status: Normal    Specimen: Throat; Swab   Result Value Ref Range     Group A Strep antigen Negative Negative       At the end of the encounter, I discussed results, diagnosis, medications. Discussed red flags for immediate return to clinic/ER, as well as indications for follow up if no improvement. Patient understood and agreed to plan. Patient was stable for discharge.

## 2024-05-28 DIAGNOSIS — R05.1 ACUTE COUGH: ICD-10-CM

## 2024-05-28 RX ORDER — ALBUTEROL SULFATE 90 UG/1
2 AEROSOL, METERED RESPIRATORY (INHALATION) EVERY 6 HOURS
Qty: 8.5 G | Refills: 3 | Status: SHIPPED | OUTPATIENT
Start: 2024-05-28

## 2024-09-25 ENCOUNTER — PATIENT OUTREACH (OUTPATIENT)
Dept: CARE COORDINATION | Facility: CLINIC | Age: 49
End: 2024-09-25
Payer: COMMERCIAL

## 2024-10-05 DIAGNOSIS — I10 BENIGN ESSENTIAL HYPERTENSION: ICD-10-CM

## 2024-10-07 RX ORDER — HYDROCHLOROTHIAZIDE 12.5 MG/1
12.5 TABLET ORAL DAILY
Qty: 30 TABLET | Refills: 0 | Status: SHIPPED | OUTPATIENT
Start: 2024-10-07 | End: 2024-10-23

## 2024-10-07 NOTE — TELEPHONE ENCOUNTER
Pt notified. Harmeet call back to schedule appt. Aware he was only given a 30 day supply.    Dolores Mack MA on 10/7/2024 at 4:38 PM

## 2024-10-21 ENCOUNTER — TELEPHONE (OUTPATIENT)
Dept: FAMILY MEDICINE | Facility: CLINIC | Age: 49
End: 2024-10-21
Payer: COMMERCIAL

## 2024-10-21 NOTE — TELEPHONE ENCOUNTER
Patient Quality Outreach    Patient is due for the following:   Physical Preventive Adult Physical    Next Steps:   Patient has upcoming appointment, these items will be addressed at that time.    Type of outreach:    Chart review performed, no outreach needed.      Questions for provider review:    None           MARY Salamanca MA on 10/21/2024 at 3:27 PM

## 2024-10-23 ENCOUNTER — OFFICE VISIT (OUTPATIENT)
Dept: FAMILY MEDICINE | Facility: CLINIC | Age: 49
End: 2024-10-23
Payer: COMMERCIAL

## 2024-10-23 ENCOUNTER — TELEPHONE (OUTPATIENT)
Dept: VASCULAR SURGERY | Facility: CLINIC | Age: 49
End: 2024-10-23

## 2024-10-23 VITALS
OXYGEN SATURATION: 97 % | DIASTOLIC BLOOD PRESSURE: 78 MMHG | HEART RATE: 60 BPM | HEIGHT: 69 IN | SYSTOLIC BLOOD PRESSURE: 140 MMHG | WEIGHT: 272 LBS | BODY MASS INDEX: 40.29 KG/M2 | TEMPERATURE: 98.7 F | RESPIRATION RATE: 14 BRPM

## 2024-10-23 DIAGNOSIS — Z12.5 SCREENING FOR PROSTATE CANCER: ICD-10-CM

## 2024-10-23 DIAGNOSIS — Z00.00 ANNUAL PHYSICAL EXAM: Primary | ICD-10-CM

## 2024-10-23 DIAGNOSIS — E78.00 PURE HYPERCHOLESTEROLEMIA: ICD-10-CM

## 2024-10-23 DIAGNOSIS — I83.90 RECURRENT VARICOSE VEINS: ICD-10-CM

## 2024-10-23 DIAGNOSIS — I10 BENIGN ESSENTIAL HYPERTENSION: ICD-10-CM

## 2024-10-23 DIAGNOSIS — Q25.1 COARCTATION OF THE AORTA, COMPLEX: ICD-10-CM

## 2024-10-23 DIAGNOSIS — M79.89 LEG SWELLING: ICD-10-CM

## 2024-10-23 DIAGNOSIS — G47.33 OSA (OBSTRUCTIVE SLEEP APNEA): ICD-10-CM

## 2024-10-23 LAB
ALBUMIN SERPL BCG-MCNC: 4.2 G/DL (ref 3.5–5.2)
ALP SERPL-CCNC: 97 U/L (ref 40–150)
ALT SERPL W P-5'-P-CCNC: 29 U/L (ref 0–70)
ANION GAP SERPL CALCULATED.3IONS-SCNC: 10 MMOL/L (ref 7–15)
AST SERPL W P-5'-P-CCNC: 27 U/L (ref 0–45)
BILIRUB SERPL-MCNC: 0.5 MG/DL
BUN SERPL-MCNC: 20.6 MG/DL (ref 6–20)
CALCIUM SERPL-MCNC: 9.5 MG/DL (ref 8.8–10.4)
CHLORIDE SERPL-SCNC: 105 MMOL/L (ref 98–107)
CHOLEST SERPL-MCNC: 211 MG/DL
CREAT SERPL-MCNC: 1.11 MG/DL (ref 0.67–1.17)
EGFRCR SERPLBLD CKD-EPI 2021: 81 ML/MIN/1.73M2
ERYTHROCYTE [DISTWIDTH] IN BLOOD BY AUTOMATED COUNT: 11.8 % (ref 10–15)
FASTING STATUS PATIENT QL REPORTED: ABNORMAL
FASTING STATUS PATIENT QL REPORTED: ABNORMAL
GLUCOSE SERPL-MCNC: 112 MG/DL (ref 70–99)
HCO3 SERPL-SCNC: 28 MMOL/L (ref 22–29)
HCT VFR BLD AUTO: 44.2 % (ref 40–53)
HDLC SERPL-MCNC: 33 MG/DL
HGB BLD-MCNC: 15.1 G/DL (ref 13.3–17.7)
LDLC SERPL CALC-MCNC: 130 MG/DL
MCH RBC QN AUTO: 29.4 PG (ref 26.5–33)
MCHC RBC AUTO-ENTMCNC: 34.2 G/DL (ref 31.5–36.5)
MCV RBC AUTO: 86 FL (ref 78–100)
NONHDLC SERPL-MCNC: 178 MG/DL
PLATELET # BLD AUTO: 167 10E3/UL (ref 150–450)
POTASSIUM SERPL-SCNC: 3.6 MMOL/L (ref 3.4–5.3)
PROT SERPL-MCNC: 6.9 G/DL (ref 6.4–8.3)
PSA SERPL DL<=0.01 NG/ML-MCNC: 0.47 NG/ML (ref 0–2.5)
RBC # BLD AUTO: 5.13 10E6/UL (ref 4.4–5.9)
SODIUM SERPL-SCNC: 143 MMOL/L (ref 135–145)
TRIGL SERPL-MCNC: 240 MG/DL
WBC # BLD AUTO: 7.7 10E3/UL (ref 4–11)

## 2024-10-23 PROCEDURE — 83036 HEMOGLOBIN GLYCOSYLATED A1C: CPT | Performed by: PHYSICIAN ASSISTANT

## 2024-10-23 PROCEDURE — G0103 PSA SCREENING: HCPCS | Performed by: PHYSICIAN ASSISTANT

## 2024-10-23 PROCEDURE — 80061 LIPID PANEL: CPT | Performed by: PHYSICIAN ASSISTANT

## 2024-10-23 PROCEDURE — 80053 COMPREHEN METABOLIC PANEL: CPT | Performed by: PHYSICIAN ASSISTANT

## 2024-10-23 PROCEDURE — 99214 OFFICE O/P EST MOD 30 MIN: CPT | Mod: 25 | Performed by: PHYSICIAN ASSISTANT

## 2024-10-23 PROCEDURE — 99396 PREV VISIT EST AGE 40-64: CPT | Performed by: PHYSICIAN ASSISTANT

## 2024-10-23 PROCEDURE — 85027 COMPLETE CBC AUTOMATED: CPT | Performed by: PHYSICIAN ASSISTANT

## 2024-10-23 PROCEDURE — 36415 COLL VENOUS BLD VENIPUNCTURE: CPT | Performed by: PHYSICIAN ASSISTANT

## 2024-10-23 RX ORDER — LISINOPRIL 40 MG/1
40 TABLET ORAL DAILY
Qty: 90 TABLET | Refills: 3 | Status: SHIPPED | OUTPATIENT
Start: 2024-10-23

## 2024-10-23 RX ORDER — HYDROCHLOROTHIAZIDE 25 MG/1
25 TABLET ORAL DAILY
Qty: 90 TABLET | Refills: 3 | Status: SHIPPED | OUTPATIENT
Start: 2024-10-23

## 2024-10-23 SDOH — HEALTH STABILITY: PHYSICAL HEALTH: ON AVERAGE, HOW MANY DAYS PER WEEK DO YOU ENGAGE IN MODERATE TO STRENUOUS EXERCISE (LIKE A BRISK WALK)?: 2 DAYS

## 2024-10-23 SDOH — HEALTH STABILITY: PHYSICAL HEALTH: ON AVERAGE, HOW MANY MINUTES DO YOU ENGAGE IN EXERCISE AT THIS LEVEL?: 20 MIN

## 2024-10-23 ASSESSMENT — ENCOUNTER SYMPTOMS
PALPITATIONS: 0
COUGH: 0
ARTHRALGIAS: 0
HEMATURIA: 0
EYE PAIN: 0
VOMITING: 0
CHILLS: 0
FEVER: 0
COLOR CHANGE: 0
DYSURIA: 0
BACK PAIN: 0
SORE THROAT: 0
SHORTNESS OF BREATH: 0
SEIZURES: 0
ABDOMINAL PAIN: 0

## 2024-10-23 ASSESSMENT — SOCIAL DETERMINANTS OF HEALTH (SDOH): HOW OFTEN DO YOU GET TOGETHER WITH FRIENDS OR RELATIVES?: ONCE A WEEK

## 2024-10-23 NOTE — PROGRESS NOTES
Preventive Care Visit  [facility]  Shen Hung PA-C, Family Medicine  Oct 23, 2024      Assessment & Plan     Annual physical exam  Overall doing well.  Will update labs today  - CBC with platelets; Future  - CBC with platelets    Benign essential hypertension  Blood pressure continues to be elevated at 140/78.  States that he does have high blood pressure readings at home.  He is otherwise asymptomatic.  Will increase hydrochlorothiazide to 25 mg a day stay on lisinopril 40 mg.  We discussed increased activity.  Limit salt and alcohol intake  - Comprehensive metabolic panel (BMP + Alb, Alk Phos, ALT, AST, Total. Bili, TP); Future  - hydroCHLOROthiazide (HYDRODIURIL) 25 MG tablet; Take 1 tablet (25 mg) by mouth daily.  - lisinopril (ZESTRIL) 40 MG tablet; Take 1 tablet (40 mg) by mouth daily.  - Comprehensive metabolic panel (BMP + Alb, Alk Phos, ALT, AST, Total. Bili, TP)    Pure hypercholesterolemia  Lipid panel a year ago was elevated.  We discussed diet and activity levels.  Will check a lipid panel today  - Lipid panel reflex to direct LDL Fasting; Future  - Lipid panel reflex to direct LDL Fasting    Coarctation of the aorta, complex  Went surgery at the age of 5.  Was seeing cardiology up until the age of 20.  He has not been having any new symptoms    KRAIG (obstructive sleep apnea)  Compliant with CPAP.    Screening for prostate cancer  Will check a screening PSA  - PSA, screen; Future  - PSA, screen    Colon cancer screening  Up-to-date.    Leg swelling  Chronic left lower leg swelling.  Has not worsened in the last year.  Also notes discoloration to the lateral leg.  He also notes heaviness to his legs.  No chest pain, shortness of breath, orthopnea.  He does have chronic venous insufficiency wondering if this is causing his symptoms.  Will increase hydrochlorothiazide to 25 mg to see if this helps with leg swelling.  Dermatitis looks insistent with stasis dermatitis.  Will  "get a ultrasound to rule out DVT but less likely suspect this.  - US Lower Extremity Venous Duplex Bilateral; Future  - Vascular Surgery Referral; Future    Recurrent varicose veins  Chronic varicose veins to left lower extremity.  Having more heaviness and pain in his legs.  Will do venous compensatory studies bilaterally and have him follow-up with vascular medicine.  - Vascular Surgery Referral; Future  - US Venous Competency Bilateral; Future    Patient has been advised of split billing requirements and indicates understanding: Yes        BMI  Estimated body mass index is 40.17 kg/m  as calculated from the following:    Height as of this encounter: 1.753 m (5' 9\").    Weight as of this encounter: 123.4 kg (272 lb).   Weight management plan: Discussed healthy diet and exercise guidelines    Counseling  Appropriate preventive services were addressed with this patient via screening, questionnaire, or discussion as appropriate for fall prevention, nutrition, physical activity, Tobacco-use cessation, social engagement, weight loss and cognition.  Checklist reviewing preventive services available has been given to the patient.  Reviewed patient's diet, addressing concerns and/or questions.   He is at risk for lack of exercise and has been provided with information to increase physical activity for the benefit of his well-being.   The patient was instructed to see the dentist every 6 months.         Subjective   Juan Carlos is a 49 year old, presenting for the following:  Physical (Non fasting Physical. Discuss bilateral leg swelling with derm concerns)        10/23/2024    11:46 AM   Additional Questions   Roomed by Dolores Mack   Accompanied by valentin          Juan Carlos is a pleasant 49-year-old male who presents to the clinic today for annual physical.  Past medical history significant for hypertension, hyperlipidemia, obstructive sleep apnea, and coarctation of the aorta.  He is doing well overall.    At the age of 5 he did " undergo surgery for coarctation of the aorta.  Was seeing cardiology up until the age of 20.    Over the last few years he has been noticing swelling to his left lower extremity.  He feels that the swelling has not worsened over the last year.  He is not having any shortness of breath or orthopnea.  He is also noted some heaviness and pain to his left lower extremity.  He also has discoloration to the lateral aspect of his left lower leg.  Chronic varicose veins to the left leg.        Health Care Directive  Patient does not have a Health Care Directive: Discussed advance care planning with patient; however, patient declined at this time.      10/23/2024   General Health   How would you rate your overall physical health? (!) FAIR   Feel stress (tense, anxious, or unable to sleep) Not at all            10/23/2024   Nutrition   Three or more servings of calcium each day? (!) NO   Diet: Regular (no restrictions)   How many servings of fruit and vegetables per day? (!) 0-1   How many sweetened beverages each day? (!) 2            10/23/2024   Exercise   Days per week of moderate/strenous exercise 2 days   Average minutes spent exercising at this level 20 min      (!) EXERCISE CONCERN      10/23/2024   Social Factors   Frequency of gathering with friends or relatives Once a week   Worry food won't last until get money to buy more No   Food not last or not have enough money for food? No   Do you have housing? (Housing is defined as stable permanent housing and does not include staying ouside in a car, in a tent, in an abandoned building, in an overnight shelter, or couch-surfing.) Yes   Are you worried about losing your housing? No   Lack of transportation? No   Unable to get utilities (heat,electricity)? No            10/23/2024   Dental   Dentist two times every year? (!) NO            10/23/2024   TB Screening   Were you born outside of the [place]? No            Today's PHQ-2 Score:       10/23/2024     8:47 AM   PHQ-2  ( 1999 [employer])   Q1: Little interest or pleasure in doing things 0    Q2: Feeling down, depressed or hopeless 0    PHQ-2 Score 0    Q1: Little interest or pleasure in doing things Not at all   Q2: Feeling down, depressed or hopeless Not at all   PHQ-2 Score 0       Patient-reported           10/23/2024   Substance Use   Alcohol more than 3/day or more than 7/wk No   Do you use any other substances recreationally? No        Social History     Tobacco Use    Smoking status: Never     Passive exposure: Never    Smokeless tobacco: Never   Vaping Use    Vaping status: Never Used   Substance Use Topics    Alcohol use: Yes     Comment: social    Drug use: Never           10/23/2024   STI Screening   New sexual partner(s) since last STI/HIV test? No      ASCVD Risk   The 10-year ASCVD risk score (Jenny LARA, et al., 2019) is: 6.7%    Values used to calculate the score:      Age: 49 years      Sex: Male      Is Non- : No      Diabetic: No      Tobacco smoker: No      Systolic Blood Pressure: 140 mmHg      Is BP treated: Yes      HDL Cholesterol: 36 mg/dL      Total Cholesterol: 214 mg/dL        10/23/2024   Contraception/Family Planning   Questions about contraception or family planning No           Reviewed and updated as needed this visit by Provider                    Past Medical History:   Diagnosis Date    Acute Sore Throat     Created by Conversion     Essential Hypercholesterolemia     Created by Conversion     Foot Pain (Soft Tissue)     Created by Conversion [facility] Annotation: Jul 18 2013 12:03PM - [place] Paul: for years     High blood pressure 12/07/2017    High blood pressure     Obesity (BMI 35.0-39.9) with comorbidity (H) 02/16/2021    Pes Planus     Created by Conversion     Renal disease     Sleep apnea      Past Surgical History:   Procedure Laterality Date    AORTA SURGERY      Age: 5    DAVINCI XI HERNIORRHAPHY INGUINAL Left 12/7/2022    Procedure: HERNIORRHAPHY,  "INGUINAL, ROBOT-ASSISTED, LAPAROSCOPIC, USING DA PALOMA XI;  Surgeon: Guero Mcgarry MD;  Location: [facility] Main OR    TESTICLE SURGERY      Age: 12       Review of Systems   Constitutional:  Negative for chills and fever.   HENT:  Negative for ear pain and sore throat.    Eyes:  Negative for pain and visual disturbance.   Respiratory:  Negative for cough and shortness of breath.    Cardiovascular:  Positive for leg swelling (left). Negative for chest pain and palpitations.   Gastrointestinal:  Negative for abdominal pain and vomiting.   Genitourinary:  Negative for dysuria and hematuria.   Musculoskeletal:  Negative for arthralgias and back pain.   Skin:  Negative for color change and rash.   Neurological:  Negative for seizures and syncope.   All other systems reviewed and are negative.         Objective    Exam  BP (!) 140/78 (BP Location: Right arm, Patient Position: Sitting, Cuff Size: Adult Regular)   Pulse 60   Temp 98.7  F (37.1  C) (Oral)   Resp 14   Ht 1.753 m (5' 9\")   Wt 123.4 kg (272 lb)   SpO2 97%   BMI 40.17 kg/m     Estimated body mass index is 40.17 kg/m  as calculated from the following:    Height as of this encounter: 1.753 m (5' 9\").    Weight as of this encounter: 123.4 kg (272 lb).    Physical Exam  Vitals and nursing note reviewed.   Constitutional:       General: He is not in acute distress.     Appearance: Normal appearance. He is well-developed and well-groomed. He is not ill-appearing or toxic-appearing.   HENT:      Head: Normocephalic and atraumatic.      Right Ear: Tympanic membrane, ear canal and external ear normal.      Left Ear: Tympanic membrane, ear canal and external ear normal.      Mouth/Throat:      Lips: Pink.      Mouth: Mucous membranes are moist.      Palate: No mass.      Pharynx: Oropharynx is clear. Uvula midline.      Tonsils: No tonsillar exudate or tonsillar abscesses.   Eyes:      General: Lids are normal.         Right eye: No discharge.         " Left eye: No discharge.   Neck:      Trachea: Trachea normal.   Cardiovascular:      Rate and Rhythm: Normal rate and regular rhythm.      Heart sounds: S1 normal and S2 normal. No murmur heard.  Pulmonary:      Effort: Pulmonary effort is normal.      Breath sounds: Normal breath sounds and air entry.   Abdominal:      General: Bowel sounds are normal. There is no distension.      Palpations: Abdomen is soft.      Tenderness: There is no abdominal tenderness. There is no guarding or rebound.   Musculoskeletal:      Cervical back: Full passive range of motion without pain and neck supple.      Right lower le+ Edema present.      Left lower leg: No edema.   Lymphadenopathy:      Cervical: No cervical adenopathy.   Skin:     General: Skin is warm and dry.      Findings: No lesion or rash.   Neurological:      General: No focal deficit present.      Mental Status: He is alert.      Cranial Nerves: No cranial nerve deficit.      Gait: Gait is intact.      Deep Tendon Reflexes:      Reflex Scores:       Patellar reflexes are 2+ on the right side and 2+ on the left side.  Psychiatric:         Attention and Perception: Attention normal.         Mood and Affect: Mood normal.         Speech: Speech normal.         Signed Electronically by: Shen Hung PA-C

## 2024-10-23 NOTE — TELEPHONE ENCOUNTER
Vascular Referral Intake    Referred by: Shen Hung PA-C  for Recurrent varicose veins     Specialty: Vein Clinic    Specific Provider if Necessary:  MD Janusz Figueredo    Visit Type: New    Time Frame: Next Available    Testing/Imaging Needed Before Consult: N/A    Appt Note: (to be pasted into appt comments, also add where additional information is located, ie, outside images pushed to PACS, in Epic, sent to HIMS, etc)  Varicose veins of left lower extremity. Bilateral leg swelling.

## 2024-10-23 NOTE — PATIENT INSTRUCTIONS
Please call the radiology dep at [facility] to schedule your test today at [phone]

## 2024-10-24 ENCOUNTER — ANCILLARY PROCEDURE (OUTPATIENT)
Dept: VASCULAR ULTRASOUND | Facility: CLINIC | Age: 49
End: 2024-10-24
Attending: PHYSICIAN ASSISTANT
Payer: COMMERCIAL

## 2024-10-24 DIAGNOSIS — I83.90 RECURRENT VARICOSE VEINS: ICD-10-CM

## 2024-10-24 PROCEDURE — 93970 EXTREMITY STUDY: CPT

## 2024-10-24 PROCEDURE — 93970 EXTREMITY STUDY: CPT | Mod: 26 | Performed by: SURGERY

## 2024-10-25 LAB
EST. AVERAGE GLUCOSE BLD GHB EST-MCNC: 123 MG/DL
HBA1C MFR BLD: 5.9 % (ref 0–5.6)

## 2024-11-11 NOTE — PATIENT INSTRUCTIONS
Alfredo    Thank you for entrusting your care with us at the [facility].      We are prescribing some compression stockings for you. I have included different suppliers that should help you get measured and fitting to ensure proper fitting socks. You should wear these stockings as much as you can. It is especially important to wear them with long periods of standing, sitting, long car rides or if you will be flying. Compression socks should get refilled every 4-6 months. They do not need to be worn at night while in bed. It is recommended to wear compression level of 20-30mmhg or higher from toes to knees.           We would like you to follow up in 3 months after wearing socks to see how you are doing.        Varicose Veins    Varicose veins are twisted, enlarged veins near the surface of the skin. They develop most often in the legs and ankles.    Some people may be more likely than others to get varicose veins because of several things. These include aging, pregnancy, being overweight, or because a parent has them. Standing or sitting for long periods of time can also increase risk of varicose veins.    Follow-up care is a key part of your treatment and safety. Be sure to make and go to all appointments, and call your doctor if you are having problems. It's also a good idea to know your test results and keep a list of the medicines you take.      Varicose veins are caused by weakened valves and veins in your legs. Normally, one-way valves in your veins keep blood flowing from your legs up toward your heart. When these valves don't work as they should, blood collects in your legs, and pressure builds up. The veins become weak, large, and twisted.    How can you care for yourself at home?  Wear compression stockings during the day to help relieve symptoms and improve blood flow. Talk to your doctor about which ones to get and where to get them.  Prop up your legs at or above the level of your  heart when possible. Try to do this for about 30 minutes at a time, about 3 times a day. This helps keep the blood from pooling in your lower legs and improves blood flow to the rest of your body.  Avoid sitting and standing for long periods. This puts added stress on your veins.  Stay at a healthy weight. Lose weight if you need to.  Try to take several short walks every day.  Get at least 30 minutes of exercise on most days of the week. Walking is a good choice. You also may want to do other activities, such as running, swimming, cycling, or playing tennis or team sports.  Do calf muscle exercises every day. When you are sitting down, rotate your feet at the ankles in both directions, making small circles. Extend your legs, and point and flex your feet.  Avoid crossing your legs at the knees when sitting.  Take good care of your skin. Treat cuts and scrapes on your legs right away. Keep your legs clean and moisturized to prevent drying and cracking. Prevent sunburns.  Do not smoke. Smoking can make varicose veins worse. If you need help quitting, talk to your doctor about stop-smoking programs and medicines. These can increase your chances of quitting for good.  If you bump your leg so hard that you know it is likely to bruise, prop up your leg and apply ice or cold packs right away. Apply the ice or cold pack for 10 to 20 minutes, 3 or more times a day. Put a thin cloth between the ice and your skin.  If you cut or scratch the skin over a vein, it may bleed a lot. Prop up your leg and apply firm pressure for a full 15 minutes.  If you have a blood clot in a varicose vein, you may have tenderness and swelling over the vein. The vein may feel firm. Be sure to call your doctor right away if you have these symptoms. If your doctor has told you how to care for the clot, follow the instructions.     Care may include the following:    Prop up your leg and apply a damp cloth that is warm or cool.  Ask your doctor if you  can take an over-the-counter pain medicine, such as acetaminophen (Tylenol), ibuprofen (Advil, Motrin), or naproxen (Aleve). Be safe with medicines. Read and follow all instructions on the label.    When should you call for help?     Call 911 anytime you think you may need emergency care. For example, call if:    You have sudden chest pain and shortness of breath, or you cough up blood.    Call your doctor now or seek immediate medical care if:    You have signs of a blood clot in your leg (called a deep vein thrombosis), such as:  Pain in your calf, back of the knee, thigh, or groin.  Swelling in the leg or groin.  A color change on the leg or groin. The skin may be reddish or purplish, depending on your usual skin color.  A varicose vein begins to bleed and you cannot stop it.  You have a tender lump in your leg.  You get an open sore.    Watch closely for changes in your health, and be sure to contact your doctor if:    Your varicose vein symptoms do not improve with home treatment.    Current as of: December 19, 2022  Author: [employer]wise Staff  Medical Review:Landon Gaitan MD - Family Medicine & GREG Riggs MD - Internal Medicine & Alverto Morgan MD - Family Medicine & Jagdish Costello MD - Family Medicine & Isael Lancaster MD - Diagnostic Radiology    Please bring your compression prescription to a home medical supply store. Here is a list of locations but not limited to.     [facility]  [facility]  [address] [place] [address]  Phone: [phone]  Fax: [phone] [facility]  [address] [place] [address]  Phone: [phone]  Fax: [phone]   [facility]  [address] [place] [address]  Phone: [phone]  Fax: [phone] [place] [id number] [facility]  [address] [place] [address]  Phone: [phone]  Fax: [phone]   [facility]  [address] [place] [address]  Phone: [phone] [facility]   [address] ([facility])  [place] [address]  Phone: [phone]   [facility]  [address] [place] [address]      Phone: [phone]  Fax: [phone] [place]  [address] [place] [address]      Phone: [phone]  Fax: [phone]     [facility] [email or web address]  [address], [place] [address]  [phone]    [facility]  [email or web address]  [address] [place] [address]  Phone: [phone]     [address] [place] [address]  Phone: [phone]    [address] [place] [address]  Phone: [phone]    [address], [place] [address]  Phone: [phone] [address], [place] [address]  Phone: [phone]    [facility] [email or web address]  [address] [place] [address]  Phone: [phone]    [address], [place] [address]  Phone: [phone] [address], [place] [address]  Phone: [phone]    Joanna Austin  [email or web address]  [phone]

## 2024-11-13 ENCOUNTER — OFFICE VISIT (OUTPATIENT)
Dept: VASCULAR SURGERY | Facility: CLINIC | Age: 49
End: 2024-11-13
Attending: PHYSICIAN ASSISTANT
Payer: COMMERCIAL

## 2024-11-13 VITALS
DIASTOLIC BLOOD PRESSURE: 70 MMHG | SYSTOLIC BLOOD PRESSURE: 148 MMHG | OXYGEN SATURATION: 95 % | HEART RATE: 67 BPM | WEIGHT: 272 LBS | BODY MASS INDEX: 40.17 KG/M2

## 2024-11-13 DIAGNOSIS — I83.892 SYMPTOMATIC VARICOSE VEINS OF LEFT LOWER EXTREMITY: Primary | ICD-10-CM

## 2024-11-13 DIAGNOSIS — I83.90 RECURRENT VARICOSE VEINS: ICD-10-CM

## 2024-11-13 DIAGNOSIS — M79.89 LEG SWELLING: ICD-10-CM

## 2024-11-13 PROCEDURE — G2211 COMPLEX E/M VISIT ADD ON: HCPCS | Performed by: SPECIALIST

## 2024-11-13 PROCEDURE — 99214 OFFICE O/P EST MOD 30 MIN: CPT | Performed by: SPECIALIST

## 2024-11-13 PROCEDURE — 99213 OFFICE O/P EST LOW 20 MIN: CPT | Performed by: SPECIALIST

## 2024-11-13 ASSESSMENT — PAIN SCALES - GENERAL: PAINLEVEL_OUTOF10: MILD PAIN (3)

## 2024-11-13 NOTE — PROGRESS NOTES
[facility] Vein Consult      Assessment:     1. varicose veins, bilateral   2. spider veins, bilateral   3. Insuffiencey of left acessory saphenous vein,       Plan:     1. Treatment options of conservative therapy of stockings use, exercise, weight loss, elevating legs when possible.    2. Script for compression stockings 20-30 mm hg  3. Ultrasound to evaluate legs for incompetency of both deep and superficial system .   4. Surgical treatment, discussed briefly today  5. Follow up: 3 months.   6. Call for any questions concerns or issues    Subjective:      Alfredo Velasquez is a 49 year old male  who was referred by Shen Hung  for evaluation of varicose veins. Symptoms include pain, aching, fatigue, burning, edema, and dermatitis. Patient has history of leg selling, pain and vein issues that have progressed. Pain and symptoms have affected daily living and work activities needing medications. Here for evaluation today. no stocking or compression devic use    Allergies:Patient has no known allergies.    Past Medical History:   Diagnosis Date    Acute Sore Throat     Created by Conversion     Essential Hypercholesterolemia     Created by Conversion     Foot Pain (Soft Tissue)     Created by [facility] Annotation: Jul 18 2013 12:03PM - [place]: for years     High blood pressure 12/07/2017    High blood pressure     Obesity (BMI 35.0-39.9) with comorbidity (H) 02/16/2021    Pes Planus     Created by Conversion     Renal disease     Sleep apnea        Past Surgical History:   Procedure Laterality Date    AORTA SURGERY      Age: 5    DAVINCI XI HERNIORRHAPHY INGUINAL Left 12/7/2022    Procedure: HERNIORRHAPHY, INGUINAL, ROBOT-ASSISTED, LAPAROSCOPIC, USING DA PALOMA XI;  Surgeon: Guero Mcgarry MD;  Location: [facility] Main OR    TESTICLE SURGERY      Age: 12         Current Outpatient Medications:     aspirin 81 MG EC tablet, Take 81 mg by mouth daily, Disp: , Rfl:      fexofenadine (ALLEGRA) 180 MG tablet, Take 180 mg by mouth daily., Disp: , Rfl:     hydroCHLOROthiazide (HYDRODIURIL) 25 MG tablet, Take 1 tablet (25 mg) by mouth daily., Disp: 90 tablet, Rfl: 3    lisinopril (ZESTRIL) 40 MG tablet, Take 1 tablet (40 mg) by mouth daily., Disp: 90 tablet, Rfl: 3     Family History   Problem Relation Age of Onset    Hypertension Mother     Sleep Apnea Mother     Hypertension Father     Myocardial Infarction Father         reports that he has never smoked. He has never been exposed to tobacco smoke. He has never used smokeless tobacco. He reports current alcohol use. He reports that he does not use drugs.      Review of Systems:    Pertinent items are noted in HPI.  Patient has symptomatic veins and changes of bilateral legs. These have progressed to the point of causing symptoms on a daily basis. This causes issues with daily activities and chores such as mowing lawn, outdoor upkeep, and standing for long lengths of time       Objective:     Vitals:    11/13/24 0833 11/13/24 0844   BP: (!) 162/90 (!) 164/85   Pulse: 67    SpO2: 95%    Weight: 123.4 kg (272 lb)      Body mass index is 40.17 kg/m .    EXAM:  GENERAL: This is a well-developed 49 year old male who appears his stated age  HEAD: normocephalic  HEENT: Pupils equal and reactive bilaterally  MOUTH: mucus membranes intact. Normal dentation  CARDIAC: RRR without murmur  CHEST/LUNG:  Clear to auscultation bilaterally  ABDOMEN: Soft, nontender, nondistended, no masses noted   NEUROLOGIC: Focally intact, nonfocal, alert and oriented x 3  INTEGUMENT: No open lesions or ulcers  VASCULAR: Pulses intact, symmetrical upper and lower extremities. There areskin changes consistent with chronic venous insufficiency. Varicose veins present in bilateral greater saphenous distribution. Spider veins present bilateral.                        Side:: Left  VCSS  PAIN:: Mild: Occasional, not restricting activity of requiring pain  medication  Varicose Veins:: Severe: Extensive: Thigh and calf or great and small saphenous distribution  Venous Edema:: Moderate: Afternoon swelling, above ankle  Skin Pigmentation:: Mild: Diffuse, but limited in area and old (brown)  Inflamation:: Absent: None  Induration:: Absent: None  Number of active ulcers:: 0  Active ulcer duration:: None  Active ulcer diameter:: None  Compression Therapy:: Not used or patient not compliant  VCSS Score:: 7  CEAP:: Skin pigmentation in the gaiter area (lipoderma tosclerosis)  Patient reported symptoms  Heaviness: a little of the time  Achiness: some of the time  Swelling: most of the time  Throbbing: a little of the time  Itching: some of the time  Impact on work/activity: a little of the time    Imaging:    Reviewed us results showing anterior accessory saphenous left side.    Exam Information    Exam Date Exam Time Accession # Performing Department Results    10/24/24  4:14 PM [id number] [facility] [place] [facility] [place]      PACS Images     Show images for US Venous Competency Bilateral     Study Result    Narrative & Impression   BILATERAL Venous Insufficiency Ultrasound (Date: 10/24/24)    BILATERAL Lower Extremity          Indication: Surveillance Bilateral Symptomatic Varicose Veins, Pain, and Swelling. More in Left Leg. No history of DVT.      Previous: None     Patient History: Swelling and Obesity     Presenting Symptoms:  Swelling, Varicose Veins, and Pain     Technique:   Supine and Reverse Trendelenburg Ultrasound of the Deep and Superficial Veins with Valsalva and Compression Augmentation Maneuvers. Duplex Imaging is performed utilizing gray-scale, Two-dimensional images, color-flow imaging, Doppler waveform analysis, and Spectral doppler imaging done with provacative maneuvers.      Incompetency Criteria:  Deep vein reflux reported when greater than 1000 msec flow reversal. Superficial vein reflux reported when equal to or greater  than 600 msec flow reversal.  vein reflux reported as greater than 350 msec flow reversal.      Right  Leg Deep Veins    CFV SFJ DFV PROX FV   PROX FV MID FV DIST POP V. PERON.   V. PTV'S   Compressibility  (FC,PC,NC) FC FC FC FC FC FC FC FC FC   Reflux -   - - - - -   -         Right Leg Superficial Veins  Location SFJ PROX THIGH MID THIGH KNEE MID CALF   GSV (mm) 4.8 3.9 4.9 5.0 3.3   Reflux - - - - -   PASV (mm) 4.2           Reflux -              Location SPJ PROX CALF MID CALF   SSV (mm) 4.5 6.0 4.8   Reflux - - -      Left  Leg Deep Veins    CFV SFJ DFV PROX FV   PROX FV MID FV DIST POP V. PERON.   V. PTV'S   Compressibility  (FC,PC,NC) FC FC FC FC FC FC FC FC FC   Reflux +   - + + - -   -         Left Leg Superficial Veins  Location SFJ PROX THIGH MID THIGH KNEE MID CALF   GSV (mm) 6.2 4.5 4.7 4.5 2.6   Reflux - - - - -   AASV (mm) 8.0 8.6 8.1       Reflux + + +       PASV (mm) 4.3           Reflux -              Location SPJ PROX CALF MID CALF   SSV (mm) 6.0 3.8 4.2   Reflux - - -      Comments: No incompetent  veins visualized.        Left GSV at Knee Varicose Vein Branch Anterior Incompetent ( 9.0 mm/ 2281 ms)        Left GSV at Knee Varicose Vein Branch Posterior Incompetent (5.9 mm/ 3627 ms).        Left GSV Ankle Varicose Vein Branch Posterior Incompetent ( 4.8 mm/ 2795 ms)           Left AASV is straight enough to ablate. The length of the left AASV is 25 cm.     Impression:       Right Deep Vein Findings: Patent deep venous system with no evidence of DVT and no evidence of reflux.     Left Deep Vein Findings: Patent deep venous system with no evidence of DVT and reflux is noted in the common femoral and femoral veins.     Superficial Vein Findings:      Right Greater Saphenous Vein: Patent Greater Saphenous Vein without evidence of reflux.     Right Small Saphenous Vein: Patent Small Saphenous Vein without evidence of reflux.     Left Greater Saphenous Vein: Patent Greater  Saphenous Vein without evidence of reflux.     Left Small Saphenous Vein: Patent Small Saphenous Vein without evidence of reflux.     Perforating and Accessory Veins: Significant reflux is noted in the left anterior accessory saphenous vein which is not noted to be tortuous.  The length of this vein is 25 cm.  There are varicose branches which show significant reflux dating from the left GSV at the knee and ankle levels.     Reference:     Compressibility: FC= Fully compressible, PC= Partially compressible, NC= Non-compressible, NV= Not Visualized     Reflux: (+) Incompetent  (-) Competent, (NV) = Not Visualized     Interpretation criteria:          Duration of Retrograde flow (milliseconds)  Category Deep Veins Superficial Veins  veins   Competent < 1000ms < 500ms < 350ms   Incompetent > 1000ms > 500ms > 350ms              Janusz Figueredo MD  General Surgery [phone]  Vascular Surgery [phone]

## 2024-11-13 NOTE — PROGRESS NOTES
[facility]        Patient is here for a consult to discuss Varicose vein(s). The patient has varicose veins that are problematic in left legs. Patient states their varicose veins are bothersome when standing, sitting, working, and household chores.     Patient has been using pain medication or anti-inflammatory's. Patient has had recent imaging on legs done. Patient has done conservative measures which include: exercise. Treatment has been successful due to keeping on moving.   L>R symptomatic Varicose veins.and bilateral leg swelling. 10/24/24 US reviewed left deep reflux and left AASV reflux. [facility] insurance changing to [employer].Advised to RTC in 3 months. Will need 2 weeks off of work post procedure pushes 600lbs.  Educated patient to work on conservative treatments: compression stockings, elevation, and exercise.     Pt is currently taking Aspirin.    Wt 123.4 kg (272 lb)   BMI 40.17 kg/m      The provider has been notified that the patient has no concerns.     Questions patient would like addressed today are: N/A.    Refills are needed: No    Has homecare services and agency name:  No

## 2025-02-19 ENCOUNTER — TELEPHONE (OUTPATIENT)
Dept: VASCULAR SURGERY | Facility: CLINIC | Age: 50
End: 2025-02-19
Payer: COMMERCIAL

## 2025-02-19 ENCOUNTER — OFFICE VISIT (OUTPATIENT)
Dept: VASCULAR SURGERY | Facility: CLINIC | Age: 50
End: 2025-02-19
Attending: SPECIALIST
Payer: COMMERCIAL

## 2025-02-19 VITALS
SYSTOLIC BLOOD PRESSURE: 138 MMHG | DIASTOLIC BLOOD PRESSURE: 78 MMHG | RESPIRATION RATE: 12 BRPM | HEART RATE: 72 BPM | TEMPERATURE: 97.9 F

## 2025-02-19 DIAGNOSIS — I83.892 SYMPTOMATIC VARICOSE VEINS OF LEFT LOWER EXTREMITY: Primary | ICD-10-CM

## 2025-02-19 DIAGNOSIS — M79.89 LEG SWELLING: ICD-10-CM

## 2025-02-19 PROCEDURE — 99214 OFFICE O/P EST MOD 30 MIN: CPT | Performed by: SPECIALIST

## 2025-02-19 PROCEDURE — 99213 OFFICE O/P EST LOW 20 MIN: CPT | Performed by: SPECIALIST

## 2025-02-19 ASSESSMENT — PAIN SCALES - GENERAL: PAINLEVEL_OUTOF10: SEVERE PAIN (9)

## 2025-02-19 NOTE — PROGRESS NOTES
Follow Up: Varicose Veins/ Venous Insufficiency    Alfredo Velasquez is a 49 year old  male here for followup. He has worn stockings now for 3          months. I saw them previously in November 2024.  Continued progression of disease and symptoms and issues; reviewed ultrasound results. Patient has ongoing symptoms with pain and swelling needing intervention with pain meds secondary to interfering with daily activities and work. This now inhibits daily chores and activities.     Allergies: No Known Allergies     Past Medical History:   Past Medical History:   Diagnosis Date    Acute Sore Throat     Created by Conversion     Essential Hypercholesterolemia     Created by Conversion     Foot Pain (Soft Tissue)     Created by Conversion [facility] Annotation: Jul 18 2013 12:03PM - [place]: for years     High blood pressure 12/07/2017    High blood pressure     Obesity (BMI 35.0-39.9) with comorbidity (H) 02/16/2021    Pes Planus     Created by Conversion     Renal disease     Sleep apnea         Past Social History:   Past Surgical History:   Procedure Laterality Date    AORTA SURGERY      Age: 5    DAVINCI XI HERNIORRHAPHY INGUINAL Left 12/7/2022    Procedure: HERNIORRHAPHY, INGUINAL, ROBOT-ASSISTED, LAPAROSCOPIC, USING DA PALOMA XI;  Surgeon: Guero Mcgarry MD;  Location: [facility] Main OR    TESTICLE SURGERY      Age: 12        CURRENT MEDS:  Current Outpatient Medications   Medication Sig Dispense Refill    aspirin 81 MG EC tablet Take 81 mg by mouth daily      fexofenadine (ALLEGRA) 180 MG tablet Take 180 mg by mouth daily.      hydroCHLOROthiazide (HYDRODIURIL) 25 MG tablet Take 1 tablet (25 mg) by mouth daily. 90 tablet 3    lisinopril (ZESTRIL) 40 MG tablet Take 1 tablet (40 mg) by mouth daily. 90 tablet 3     Current Facility-Administered Medications   Medication Dose Route Frequency Provider Last Rate Last Admin    lidocaine 112 mL, EPINEPHrine (ADRENALIN) 1.12 mg in sodium chloride  0.9 % 1,113.12 mL (TUMESCENT)   Irrigation Once Janusz Figueredo MD           Family History   Problem Relation Age of Onset    Hypertension Mother     Sleep Apnea Mother     Hypertension Father     Myocardial Infarction Father         reports that he has never smoked. He has never been exposed to tobacco smoke. He has never used smokeless tobacco. He reports current alcohol use. He reports that he does not use drugs.    Review of Systems:  Negative except progression of disease and issues while wearing compression and conservative management.   Patient has symptomatic veins and changes of bilateral legs. These have progressed to the point of causing symptoms on a daily basis. This causes issues with daily activities and chores such as mowing lawn, outdoor upkeep, and standing for long lengths of time. He has worn compression now for greater than 3 months, worked on an exercise and weight loss program and continues to have symptoms.     OBJECTIVE:  Vitals:    02/19/25 0830   BP: 138/78   Pulse: 72   Resp: 12   Temp: 97.9  F (36.6  C)     There is no height or weight on file to calculate BMI.    EXAM:  GENERAL: This is a well-developed 49 year old male who appears his stated age  HEAD: normocephalic  HEENT: Pupils equal and reactive bilaterally  CARDIAC: RRR without murmur  CHEST/LUNG:  Clear to auscultation  ABDOMEN: Soft, nontender, nondistended, no masses    NEUROLOGIC: Focally intact, nonfocal  VASCULAR: Pulses intact, symmetrical upper and lower extremities. , bilateral varicose veins and chronic stasis changes.                    Side:: Left  VCSS  PAIN:: Severe: Daily, severe limiting activities or requiring regular use of pain meds  Varicose Veins:: Severe: Extensive: Thigh and calf or great and small saphenous distribution  Venous Edema:: Moderate: Afternoon swelling, above ankle  Skin Pigmentation:: Mild: Diffuse, but limited in area and old (brown)  Inflamation:: Absent: None  Induration:: Absent:  None  Number of active ulcers:: 0  Active ulcer duration:: None  Active ulcer diameter:: None  Compression Therapy:: Not used or patient not compliant  VCSS Score:: 9  CEAP:: Simple varicose veins only  Patient reported symptoms  Vein Appearance: Very noticeable  Heaviness: a little of the time  Achiness: some of the time  Swelling: most of the time  Throbbing: a little of the time  Itching: some of the time  Impact on work/activity: Mildly reduced work/activity    Imaging:    Reviewed us showing left anterior accessory saphenous vein insuffiencey.      Exam Information    Exam Date Exam Time Accession # Performing Department Results    10/24/24  4:14 PM [id number] [facility] [place]      PACS Images     Show images for US Venous Competency Bilateral     Study Result    Narrative & Impression   BILATERAL Venous Insufficiency Ultrasound (Date: 10/24/24)    BILATERAL Lower Extremity          Indication: Surveillance Bilateral Symptomatic Varicose Veins, Pain, and Swelling. More in Left Leg. No history of DVT.      Previous: None     Patient History: Swelling and Obesity     Presenting Symptoms:  Swelling, Varicose Veins, and Pain     Technique:   Supine and Reverse Trendelenburg Ultrasound of the Deep and Superficial Veins with Valsalva and Compression Augmentation Maneuvers. Duplex Imaging is performed utilizing gray-scale, Two-dimensional images, color-flow imaging, Doppler waveform analysis, and Spectral doppler imaging done with provacative maneuvers.      Incompetency Criteria:  Deep vein reflux reported when greater than 1000 msec flow reversal. Superficial vein reflux reported when equal to or greater than 600 msec flow reversal.  vein reflux reported as greater than 350 msec flow reversal.      Right  Leg Deep Veins    CFV SFJ DFV PROX FV   PROX FV MID FV DIST POP V. PERON.   V. PTV'S   Compressibility  (FC,PC,NC) FC FC FC FC FC FC FC FC FC   Reflux -   -  - - - -   -         Right Leg Superficial Veins  Location SFJ PROX THIGH MID THIGH KNEE MID CALF   GSV (mm) 4.8 3.9 4.9 5.0 3.3   Reflux - - - - -   PASV (mm) 4.2           Reflux -              Location SPJ PROX CALF MID CALF   SSV (mm) 4.5 6.0 4.8   Reflux - - -      Left  Leg Deep Veins    CFV SFJ DFV PROX FV   PROX FV MID FV DIST POP V. PERON.   V. PTV'S   Compressibility  (FC,PC,NC) FC FC FC FC FC FC FC FC FC   Reflux +   - + + - -   -         Left Leg Superficial Veins  Location SFJ PROX THIGH MID THIGH KNEE MID CALF   GSV (mm) 6.2 4.5 4.7 4.5 2.6   Reflux - - - - -   AASV (mm) 8.0 8.6 8.1       Reflux + + +       PASV (mm) 4.3           Reflux -              Location SPJ PROX CALF MID CALF   SSV (mm) 6.0 3.8 4.2   Reflux - - -      Comments: No incompetent  veins visualized.        Left GSV at Knee Varicose Vein Branch Anterior Incompetent ( 9.0 mm/ 2281 ms)        Left GSV at Knee Varicose Vein Branch Posterior Incompetent (5.9 mm/ 3627 ms).        Left GSV Ankle Varicose Vein Branch Posterior Incompetent ( 4.8 mm/ 2795 ms)           Left AASV is straight enough to ablate. The length of the left AASV is 25 cm.     Impression:       Right Deep Vein Findings: Patent deep venous system with no evidence of DVT and no evidence of reflux.     Left Deep Vein Findings: Patent deep venous system with no evidence of DVT and reflux is noted in the common femoral and femoral veins.     Superficial Vein Findings:      Right Greater Saphenous Vein: Patent Greater Saphenous Vein without evidence of reflux.     Right Small Saphenous Vein: Patent Small Saphenous Vein without evidence of reflux.     Left Greater Saphenous Vein: Patent Greater Saphenous Vein without evidence of reflux.     Left Small Saphenous Vein: Patent Small Saphenous Vein without evidence of reflux.     Perforating and Accessory Veins: Significant reflux is noted in the left anterior accessory saphenous vein which is not noted to be tortuous.   The length of this vein is 25 cm.  There are varicose branches which show significant reflux dating from the left GSV at the knee and ankle levels.     Reference:     Compressibility: FC= Fully compressible, PC= Partially compressible, NC= Non-compressible, NV= Not Visualized     Reflux: (+) Incompetent  (-) Competent, (NV) = Not Visualized     Interpretation criteria:          Duration of Retrograde flow (milliseconds)  Category Deep Veins Superficial Veins  veins   Competent < 1000ms < 500ms < 350ms   Incompetent > 1000ms > 500ms > 350ms              Assessment/Plan:    She has  incompetency and insufficiency of the Left  Accessory  saphenous vein.  Left anterior accessory saphenous vein measures 8.0 mm at the junction. Deep systems are intact. No DVTs. Great candidate for endovenous  closure. We spent 20 today and discussed the procedure. The risks of anesthesia, infection, bleeding, clotting, DVTs, numbess at the insertion site dermatome and  the process and procedure were discussed. Answered all questions today. Will submit this to [employer] insurance if needed for pre approval.Will set this up when approved. Discussed need to have a  and procedure woul take around 30 minutes with total time 2-3 hours. Also understands a small screening ultrasound 2-3 days out to rule out clot formation at the closed vessel.    DIAGNOSIS: Venous insufficiency of the   left anterior accessory vein      PROCEDURE: Endovenous closure of the  left anterior accessory vein    Janusz Figueredo MD  General Surgery [phone]  Vascular Surgery [phone]

## 2025-02-19 NOTE — PATIENT INSTRUCTIONS
Pre-Procedure Instructions for Varicose Vein Ablation fax work paperwork to [phone]  We look forward to scheduling a varicose vein procedure for you. First, we will submit a prior authorization to your insurance company if required. This typically takes 10-14 days. We will contact you once we have gotten the approval to schedule the procedure.  The following is helpful information for you regarding your treatment:  **Important: A  will be needed post procedure.  (Unable to use Taxi or Uber).  Please allow 1-2 hours for your vein procedure appointment.  Take your routine medications as you normally would except for blood thinners. Aspirin is ok to continue.  If you take Warfarin, Xarelto, or Eliquis this will need to be HELD prior to procedure according to primary care provider or cardiology who prescribes this medication. Please notify us if you take this medication.  We have thigh high compression stocking sizes medium to X-large that will be applied immediately after the procedure. You will need to provide your own if one of these sizes will not fit. Another option is to bring in knee high compression and biker compression shorts.   Please wear comfortable clothing.  We recommend that you bring a change of undergarment in case it gets stained by the cleansing solution.  Feel free to bring a personal music player or a CD to listen to during your procedure.  It is advised not to fly within 3 weeks after the procedure.  You do not have to fast prior to this procedure.  For any questions regarding your procedure please call [phone] to speak with the nurse.  If you would like a Good [place] Estimate for your upcoming procedure contact Cost of Care Estimates at [phone], advocates are available Monday through Friday 8am - 4:30pm.    Radiofrequency Ablation Codes:   - 39378 for first vein in either leg  Varicose veins may be a sign of something more severe - venous reflux disease.  Healthy leg veins  have valves that keep blood flowing to the heart. Venous reflux disease develops when the valves stop working properly and allow blood to flow backward (i.e., reflux) and pool in the lower leg veins.   If venous reflux disease is left untreated, symptoms can worsen over time. Your doctor can help you understand if you have this condition.     Superficial venous reflux disease may cause the following signs and symptoms in your legs:  Varicose veins  Aching  Swelling  Cramping  Heaviness or tiredness  Itching  Restlessness  Open skin sores    Superficial venous reflux disease treatment aims to reduce or stop the backward flow of blood. The following may be prescribed to treat your superficial venous reflux disease. Your doctor can help you decide which treatment is best for you:   Compression stockings   Removing diseased vein   Closing diseased vein (through thermal or non-thermal treatment)     Radiofrequency Ablation (RFA) Treatment for Varicose Veins  Radiofrequency ablation (RFA) is a thermal procedure to treat varicose veins. It uses heat created from radiofrequency (RF). During RFA treatment, RF heat is sent into your vein through a thin, flexible tube (catheter). This closes off blood flow in the main problem vein.           Getting ready for your treatment  Tell your provider if you:  Are pregnant or think you may be pregnant  Are breastfeeding  Smoke, use alcohol or street drugs on a regular basis  Have any allergies or intolerances to certain medicines. Explain what reaction you have had to these medicines in the past.      The day of your treatment  The treatment takes 45 to 60 minutes. The entire treatment (including time to prepare and recover) takes about 1 to 3 hours. You can go home the same day. For the treatment:   You'll lie down on a hospital bed.  An imaging method, such as ultrasound, is used to guide the procedure.  The leg to be treated is injected with numbing medicine.  Once your leg is  numb, a needle makes a small hole (puncture) in the vein to be treated.  The catheter with the RF heat source is inserted into your vein.  More numbing medicine may be injected around your vein.  Once the catheter is in the right position, it is then slowly drawn backward. As the catheter sends out heat, the vein is closed off.  In some cases, other side branch varicose veins may be removed or tied off through a few small cuts (incisions).  When the treatment is done, the catheter is removed. Pressure is applied to the insertion site to stop any bleeding. An elastic compression stocking or a bandage may then be put on your leg.       Recovering at home  Once at home, follow all the instructions you've been given. Be sure to:  Check for signs of infection at the catheter insertion sites (see below)  Wear thigh high compressions as directed  Keep your legs raised (elevated) as directed  Walk a few times a day  Avoid heavy exercise, lifting, and standing for long periods as advised. No lifting >20lbs for 2 weeks.   Avoid air travel, hot baths, saunas, or whirlpools as advised  Do not drive or operate heavy machinery for 24 hours after the procedure  Leakage from the numbing medications the first few hours is normal.          Call your healthcare provider if you have any of the following:  Fever of 100.4 F (38 C) or higher, or as directed by your provider  Chest pain or trouble breathing  Signs of infection at the catheter insertion site. These include increased redness or swelling (inflammation), warmth, increasing pain, bleeding, or bad-smelling discharge.  Severe numbness or tingling in the treated leg  Severe pain or swelling in the treated leg      Follow-up  You'll have an ultrasound within the same week as the procedure to check for problems, such as blood clots. You will follow up with the provider after 6 weeks.   Risks and possible complications   These include the following:  Bleeding, Infection, Blood clots,  Damage to the nerves in the treated area, Irritation or burning of the skin over the treated vein. Treatment doesn't improve the look or the symptoms of the problem veins  Risks of any medicines used during the treatment

## 2025-02-19 NOTE — PROGRESS NOTES
[facility]        Patient is here for a 3 months follow up  to discuss Varicose vein(s). The patient has varicose veins that are problematic in left legs. Patient states their varicose veins are bothersome when standing, sitting, working, and household chores.     Patient has not been using pain medication or anti-inflammatory's. Patient has had recent imaging on legs done. Patient has done conservative measures which include: compression stockings, elevation, exercise, and weight loss. Treatment has been unsuccessful due to continued pain some improvement in swelling.     Educated patient to work on conservative treatments: compression stockings, elevation, exercise, and weight loss.      Pt is currently taking Aspirin.    /78   Pulse 72   Temp 97.9  F (36.6  C)   Resp 12         L>R symptomatic Varicose veins.and bilateral leg swelling. 10/24/24 US reviewed left deep reflux and left AASV reflux. Will be changing to [employer] insurance./Will need 1 weeks off of work post procedure pushes 600lbs.Patient will require a note at procedure. Left AASV RFA to preauth through [employer].

## 2025-02-19 NOTE — TELEPHONE ENCOUNTER
Vein Prior Authorization Form    Vascular NPI: [id number] Tax ID: [id number]    Ordering/Performing Physician: Dr. Janusz Figueredo NPI: [id number]  Payor: [employer]  Procedure (include vein(s) and laterality): Radiofrequency ablation (RFA) of left AASV  CPT: 36475 x1  Diagnosis Code: I83.892 Symptomatic varicose veins of left lower extremity and I87.2 Venous insufficiency    Need 2 days for procedure?: No  Other instructions: will need 2 weeks off work

## 2025-02-20 NOTE — TELEPHONE ENCOUNTER
[employer] PA/form done, face sheet, 11/13 office note/CEAP, and US result saved; draft in email.   Waiting for 2/19 office note to be signed.

## 2025-03-05 ENCOUNTER — MYC MEDICAL ADVICE (OUTPATIENT)
Dept: VASCULAR SURGERY | Facility: CLINIC | Age: 50
End: 2025-03-05
Payer: COMMERCIAL

## 2025-04-17 ENCOUNTER — MYC MEDICAL ADVICE (OUTPATIENT)
Dept: VASCULAR SURGERY | Facility: CLINIC | Age: 50
End: 2025-04-17
Payer: COMMERCIAL

## 2025-05-07 ENCOUNTER — OFFICE VISIT (OUTPATIENT)
Dept: VASCULAR ULTRASOUND | Facility: CLINIC | Age: 50
End: 2025-05-07
Attending: SPECIALIST
Payer: COMMERCIAL

## 2025-05-07 VITALS
HEART RATE: 65 BPM | RESPIRATION RATE: 12 BRPM | SYSTOLIC BLOOD PRESSURE: 159 MMHG | DIASTOLIC BLOOD PRESSURE: 81 MMHG | TEMPERATURE: 98.1 F | OXYGEN SATURATION: 92 %

## 2025-05-07 DIAGNOSIS — I83.892 SYMPTOMATIC VARICOSE VEINS OF LEFT LOWER EXTREMITY: Primary | ICD-10-CM

## 2025-05-07 DIAGNOSIS — M79.89 LEG SWELLING: ICD-10-CM

## 2025-05-07 PROCEDURE — 1125F AMNT PAIN NOTED PAIN PRSNT: CPT

## 2025-05-07 PROCEDURE — 250N000011 HC RX IP 250 OP 636: Performed by: SPECIALIST

## 2025-05-07 PROCEDURE — 3079F DIAST BP 80-89 MM HG: CPT

## 2025-05-07 PROCEDURE — 3077F SYST BP >= 140 MM HG: CPT

## 2025-05-07 PROCEDURE — 36475 ENDOVENOUS RF 1ST VEIN: CPT | Mod: LT | Performed by: SPECIALIST

## 2025-05-07 PROCEDURE — 250N000009 HC RX 250: Performed by: SPECIALIST

## 2025-05-07 PROCEDURE — C1886 CATHETER, ABLATION: HCPCS

## 2025-05-07 PROCEDURE — 258N000003 HC RX IP 258 OP 636: Performed by: SPECIALIST

## 2025-05-07 RX ADMIN — LIDOCAINE HYDROCHLORIDE: 10 INJECTION, SOLUTION INFILTRATION; PERINEURAL at 13:34

## 2025-05-07 RX ADMIN — LIDOCAINE HYDROCHLORIDE 10 ML: 10 INJECTION, SOLUTION INFILTRATION; PERINEURAL at 13:34

## 2025-05-07 ASSESSMENT — PAIN SCALES - GENERAL
PAINLEVEL_OUTOF10: MODERATE PAIN (6)
PAINLEVEL_OUTOF10: MILD PAIN (1)

## 2025-05-07 NOTE — PATIENT INSTRUCTIONS
Discharge Instructions Following Venous Closure  Today, you had this procedure done: Off work for 1 week.  - Vein closure using RadioFrequency Ablation (RFA)    Dressing  Leakage from the numbing medications the first few hours is normal if you had an RFA.   Wear your thigh high compression for 48 hours continuously until your ultrasound after the procedure.  It is ok to remove your stocking to shower in 24 hours but then reapply after.  Wear the thigh high stocking for two weeks after the procedure while you are up. It is ok to take off when you sleep.   After two weeks, you can transition into compression stockings of your choice.   Steri strips were applied on your incision(s) today. Please leave them in place for the next 7-10 days. They will start to peel off on its own.       Activity  On the day of the procedure, make sure to walk around the house for a few minutes each hour until bedtime.  The day after the procedure you should advance activity as tolerated.  NO strenuous activities though for 2 weeks.  In general, avoid prolonged standing in place and sitting with your legs down.  Both activities will cause blood to pool in your legs resulting in more swelling and discomfort.  Do not drive or operate heavy machinery for 24 hours after the procedure (excludes if you had a VenaSeal).   Do not take baths or use hot tubs or swimming pools until your incision site is healed.  Do not fly for the next 3 weeks.  Do not lift > 20 lbs. for 1 weeks.     Post Procedure Ultrasound & Follow-up  You will need an ultrasound within 2-3 days following the closure procedure.  Then plan to see your surgeon in the office six weeks after your procedure. Call us to schedule this appointment if one has not already been made.  Post Procedure Signs and Symptoms  There can be a mild amount of bruising and numbness after this procedure.  This will typically take a few weeks to fade.  You may feel pain or tenderness in your inner  thigh.  Mild pain medication such as Tylenol or Ibuprofen maybe helpful.  It is normal to have fluid leaking from the injection areas the day of the procedure and it may be blood tinged.      Call your healthcare provider if you have any of the following:  Fever of 100.4 F (38 C) or higher, or as directed by your provider  Chest pain or trouble breathing  Signs of infection at the catheter insertion site. These include increased redness or swelling (inflammation), warmth, increasing pain, bleeding, or bad-smelling discharge.  Severe numbness or tingling in the treated leg  Severe pain or swelling in the treated leg  For emergencies after 4:30pm Monday - Friday, holidays and weekends:  Dr. Janusz Figueredo, [facility] at [phone]  Dr. Faby Mayorga or Dr. Ne Kay, [facility] at [phone]  Thank you for allowing us to be part of your care

## 2025-05-07 NOTE — PROGRESS NOTES
VNUS Radiofrequency Ablation- off work 1 week due to left AASV    Pre-Procedure: complains of foot numbness prior to clusre  Admit  [x]Consent for Radio Frequency Ablation of the   []Right Saphenous Vein and/or branches  [x]Left Saphenous Vein and/or branches  Vitals  [x]Vital signs per routine    Nursing Orders  [x]Vein Mapping of  []R extremity  [x]L extremity  [x]Sterile Prep to extremity  [x]Obtain Tumescent Solution 500mL (NS 1000mL, 1% Lidocaine w Epi 56mL, 8.4% Sodium Bicarbonate 5.6mL)  Shoulder arm tingling had arm above head  Medications  []Diazepam (Valium) 5mg PO, May Repeat x 1 for anxiety, relaxtion.    Post-Procedure:  Admission  [x]Post Procedure care - call physician for status update  []Admit to inpatient - Please document reason for admission in chart  Interventions require inpatient services  High risk of deterioration due to comorbidities  High risk of deterioration due to nature of admitting diagnosis  Location:    Vitals  [x]Vital signs per routine    Nursing Orders  [x]Thigh High Compression Stocking 20-30mm or 30-40mm to  []R extremity  [x]L extremity  [x]Check insertion site for bleeding or hematoma.  If bleeding or hematoma occurs, apply pressure and notify MD    Discharge  [x]Discharge home if no complications arise.  [x]Give post radiofrequency ablation discharge instructions to patient.  [x]Follow up venous ultrasound 72-96 hours after procedure.  [x]Follow up appointment with MD at 6 weeks.  [x]Contact MD for further questions    Patient is here for endovenous radiofrequency ablation of the left  anterior accessory saphenous vein. The location of access left anterior thigh lower extremity. The length of the vessel treated was 22 cm and time of treatment 1 minutes and 40 seconds. Procedure start time  1353 and end time 1410.  The amount of tumescent used was 150 mL.     At time of procedure the staff in the room were aJnusz Figueredo MD, GARY SanzT, and Víctor Cueto RN

## 2025-05-09 ENCOUNTER — ANCILLARY PROCEDURE (OUTPATIENT)
Dept: VASCULAR ULTRASOUND | Facility: CLINIC | Age: 50
End: 2025-05-09
Attending: SPECIALIST
Payer: COMMERCIAL

## 2025-05-09 DIAGNOSIS — M79.89 LEG SWELLING: ICD-10-CM

## 2025-05-09 DIAGNOSIS — I83.892 SYMPTOMATIC VARICOSE VEINS OF LEFT LOWER EXTREMITY: ICD-10-CM

## 2025-05-09 PROCEDURE — 93971 EXTREMITY STUDY: CPT | Mod: LT

## 2025-05-09 PROCEDURE — 93971 EXTREMITY STUDY: CPT | Mod: 26 | Performed by: SURGERY

## 2025-06-10 NOTE — PATIENT INSTRUCTIONS
"We would like to see you back in 4 months for a follow up. You can resume normal activity with exception of no flight for one more week. You should wear compression as much as you can. It is especially important to wear compression with long periods of sitting/standing, long car rides or if you will be flying. Compression socks should get refilled every 4-6 months.If you do a lot of standing it is good to do calf raises to help keep the blood pumping. If you sit a lot at work it is good to get up periodically to walk around. Elevation of the foot of your bed 4-6\" helps the blood return back to where it is needed. They do not need to be worn at night while in bed. We can refill your compression prescription for 1 year otherwise your primary is able to refill them for you.    Please call us with any problems or questions at [phone].    [facility]    [facility]  [address]  [place] [address]  Phone: [phone]    [facility]   [address] ([address] [facility])  [place] [address]  Phone: [phone]    [facility]  [address]   [place] [address]   Phone: [phone]    [facility]  [address]  [place] [address]  Phone: [phone]    [facility]   [address]  [place] [address]  Phone: [phone]    [facility]  [address]  [place] [address]  Phone: [phone]    [facility]  [address]  [address]  [place] [address]  Phone: [phone]    [place]   [address] [place] [address]   Phone: [phone]      [facility]   [address]             [address]                 [address]     [place] [address]      [place] [address]         [place] [address]  Phone: [phone]      Phone: [phone]            Phone: [phone]  Fax: [phone]          Fax:[phone]                 Fax: [phone]                                     Joanna Austin  [phone]  [email or web address]

## 2025-06-18 ENCOUNTER — VIRTUAL VISIT (OUTPATIENT)
Dept: VASCULAR SURGERY | Facility: CLINIC | Age: 50
End: 2025-06-18
Attending: SPECIALIST
Payer: COMMERCIAL

## 2025-06-18 VITALS — BODY MASS INDEX: 40.17 KG/M2 | HEIGHT: 69 IN

## 2025-06-18 DIAGNOSIS — I83.892 SYMPTOMATIC VARICOSE VEINS OF LEFT LOWER EXTREMITY: Primary | ICD-10-CM

## 2025-06-18 DIAGNOSIS — I83.90 RECURRENT VARICOSE VEINS: ICD-10-CM

## 2025-06-18 DIAGNOSIS — M79.89 LEG SWELLING: ICD-10-CM

## 2025-06-18 PROCEDURE — 1126F AMNT PAIN NOTED NONE PRSNT: CPT | Mod: 95 | Performed by: SPECIALIST

## 2025-06-18 PROCEDURE — 98004 SYNCH AUDIO-VIDEO EST SF 10: CPT | Performed by: SPECIALIST

## 2025-06-18 ASSESSMENT — PAIN SCALES - GENERAL: PAINLEVEL_OUTOF10: NO PAIN (0)

## 2025-06-18 NOTE — PROGRESS NOTES
"Virtual Visit Details    Type of service:  Video Visit     Originating Location (pt. Location): {video visit patient location:781189::\"Home\"}  {PROVIDER LOCATION On-site should be selected for visits conducted from your clinic location or adjoining [facility] hospital, academic office, or other nearby [facility] building. Off-site should be selected for all other provider locations, including home:102919}  Distant Location (provider location):  {virtual location provider:806873}  Platform used for Video Visit: {Virtual Visit Platforms:204884::\"Splash.FM\"}  "

## 2025-06-18 NOTE — PROGRESS NOTES
"Post Procedure Note Endovenous Closure  Virtual Visit Details:    Type of service:  video visit     Length of call: 10 minutes    Originating Location (Pt. Location): Home     Distant Location (Provider location):  [facility]     Platform used for visit:  Matthew     Received verbal consent for virtual visit.      S: Alfredo Velasquez is a 49 year old male S/P Left  acessory saphenous vein leg endovenous closure of  lower ext. 6 weeks out from procedure. Doing well, wore male  thigh high socks. Minimal discomfort. Some superficial phlibitis. Which is resolving.     O:   Vitals:    06/18/25 0749   Height: 1.753 m (5' 9\")       Status: doing well and has discoloration    General: no apparent distress  Legs look good no signs of infection, incisions healing nicely.    Imaging:    Reviewed and my interpretation of the US is closed left anterior accessory saphenous vein.    Exam Information    Exam Date Exam Time Accession # Performing Department Results    5/9/25  9:42 AM [id number] [facility] [place]      PACS Images     Show images for US Venous Post Ablation Leg Left     Study Result    Narrative & Impression   Left Venous Ultrasound Status Post Radiofrequency Ablation (Date: 05/09/25)        Indication: Follow-up saphenous vein Radiofrequency ablation     Date of Procedure: Left 5/7/2025          Left CFV/SFJ Compression:  Fully Compressible     Reference:   (FC)-Fully Compressible           (PC)-Partially Compressible   (NC) Non-Compressible     Location Left AASV   Proximal Thigh NC   Mid Thigh NC   Distal Thigh NC            Impression: Noncompressible left anterior extensor saphenous vein from proximal thigh to distal thigh.  The vein is patent distal to the access point.  No evidence of DVT     Reference:     Compressibility: FC= Fully compressible, PC= Partially compressible, NC= Non-compressible, NV= Not Visualized     Venous " Doppler: (+) = Present  (0) = Absent  (-) = Decreased/Unable to Evaluate, (NV) = Not Visualized             A/P: S/p endovenous closure. For insuffiencey of veins     May switch to knee high support socks   Resume all activities   RTC 4 months   Call for any questions or concerns     Janusz Figueredo MD   [facility]

## 2025-06-18 NOTE — NURSING NOTE
Current patient location: [facility] [address] [place] [facility] [address]    Is the patient currently in the state of [place]? YES    Visit mode: VIDEO    If the visit is dropped, the patient can be reconnected by:VIDEO VISIT: Text to cell phone:   Telephone Information:   Mobile [phone]       Will anyone else be joining the visit? NO  (If patient encounters technical issues they should call [phone])    Are changes needed to the allergy or medication list? No    Are refills needed on medications prescribed by this physician? NO    Rooming Documentation:  Questionnaire(s) completed    Reason for visit: TIM MORALES

## (undated) DEVICE — DAVINCI XI OBTURATOR BLADELESS 8MM 470359

## (undated) DEVICE — DRAPE SHEET REV FOLD 3/4 9349

## (undated) DEVICE — PREP CHLORAPREP 26ML TINTED HI-LITE ORANGE 930815

## (undated) DEVICE — DAVINCI XI SEAL UNIVERSAL 5-8MM 470361

## (undated) DEVICE — LUBRICANT INST ELECTROLUBE EL101

## (undated) DEVICE — GLOVE BIOGEL PI ORTHOPRO SZ 7.5 47675

## (undated) DEVICE — SYR 50ML SLIP TIP W/O NDL 309654

## (undated) DEVICE — DAVINCI XI DRAPE COLUMN 470341

## (undated) DEVICE — SU MONOCRYL+ 4-0 18IN PS2 UND MCP496G

## (undated) DEVICE — MARKER SURG SKIN STRL 77734

## (undated) DEVICE — SU WND CLOSURE V-LOC 90 SZ 2-0 12" GS-21 VLOCM0315

## (undated) DEVICE — Device

## (undated) DEVICE — CUSTOM PACK LAP CHOLE SBA5BLCHEA

## (undated) DEVICE — PROTECTOR ARM STANDARD ONE STEP

## (undated) DEVICE — TUBING SMOKE EVAC PNEUMOCLEAR HIGH FLOW 0620050250

## (undated) DEVICE — DAVINCI XI DRAPE ARM 470015

## (undated) DEVICE — DECANTER VIAL 2006S

## (undated) DEVICE — NDL INSUFFLATION 13GA 120MM C2201

## (undated) DEVICE — SUTURE VICRYL+ 2-0 27IN SH UND VCP417H

## (undated) RX ORDER — ONDANSETRON 2 MG/ML
INJECTION INTRAMUSCULAR; INTRAVENOUS
Status: DISPENSED
Start: 2022-12-07

## (undated) RX ORDER — NEOSTIGMINE METHYLSULFATE 1 MG/ML
VIAL (ML) INJECTION
Status: DISPENSED
Start: 2022-12-07

## (undated) RX ORDER — GLYCOPYRROLATE 0.2 MG/ML
INJECTION INTRAMUSCULAR; INTRAVENOUS
Status: DISPENSED
Start: 2022-12-07

## (undated) RX ORDER — LIDOCAINE HYDROCHLORIDE 10 MG/ML
INJECTION, SOLUTION EPIDURAL; INFILTRATION; INTRACAUDAL; PERINEURAL
Status: DISPENSED
Start: 2022-12-07

## (undated) RX ORDER — DEXAMETHASONE SODIUM PHOSPHATE 10 MG/ML
INJECTION, EMULSION INTRAMUSCULAR; INTRAVENOUS
Status: DISPENSED
Start: 2022-12-07

## (undated) RX ORDER — FENTANYL CITRATE 50 UG/ML
INJECTION, SOLUTION INTRAMUSCULAR; INTRAVENOUS
Status: DISPENSED
Start: 2022-12-07

## (undated) RX ORDER — PROPOFOL 10 MG/ML
INJECTION, EMULSION INTRAVENOUS
Status: DISPENSED
Start: 2022-12-07